# Patient Record
Sex: MALE | Race: WHITE | NOT HISPANIC OR LATINO | Employment: FULL TIME | URBAN - METROPOLITAN AREA
[De-identification: names, ages, dates, MRNs, and addresses within clinical notes are randomized per-mention and may not be internally consistent; named-entity substitution may affect disease eponyms.]

---

## 2017-02-03 ENCOUNTER — ALLSCRIPTS OFFICE VISIT (OUTPATIENT)
Dept: OTHER | Facility: OTHER | Age: 47
End: 2017-02-03

## 2017-02-03 LAB
CLARITY UR: NORMAL
COLOR UR: YELLOW
GLUCOSE (HISTORICAL): NEGATIVE
HGB UR QL STRIP.AUTO: NEGATIVE
LEUKOCYTE ESTERASE UR QL STRIP: NEGATIVE
NITRITE UR QL STRIP: NEGATIVE
PH UR STRIP.AUTO: 7 [PH]
PROT UR STRIP-MCNC: NEGATIVE MG/DL
SP GR UR STRIP.AUTO: 1.01
UROBILINOGEN UR QL STRIP.AUTO: 0.2

## 2017-10-04 ENCOUNTER — ALLSCRIPTS OFFICE VISIT (OUTPATIENT)
Dept: OTHER | Facility: OTHER | Age: 47
End: 2017-10-04

## 2017-10-05 NOTE — PROGRESS NOTES
Assessment  1  Viral syndrome (079 99) (B34 9)    Discussion/Summary    Take Tylenol as directed for pain  Take OTC DayQuil and NyQuil as discussed to treat symptoms  Call or return with problems or concerns  Possible side effects of new medications were reviewed with the patient/guardian today  The treatment plan was reviewed with the patient/guardian  The patient/guardian understands and agrees with the treatment plan      Chief Complaint  sore throat for 4-5 weeks      History of Present Illness  HPI: Has had a sore throat on and off since the beginning of September  Increased pain to throat started this afternoon  States difficult to swallow  Also c/o right earache and headache  Review of Systems    Constitutional: no fever or chills, feels well, no tiredness, no recent weight loss or weight gain  ENT: earache-and-sore throat, but-as noted in HPI  Cardiovascular: no complaints of slow or fast heart rate, no chest pain, no palpitations, no leg claudication or lower extremity edema  Respiratory: no complaints of shortness of breath, no wheezing or cough, no dyspnea on exertion, no orthopnea or PND  Gastrointestinal: no complaints of abdominal pain, no constipation, no nausea or vomiting, no diarrhea or bloody stools  Genitourinary: no complaints of dysuria or incontinence, no hesitancy, no nocturia, no genital lesion, no inadequacy of penile erection  Musculoskeletal: no complaints of arthralgia, no myalgia, no joint swelling or stiffness, no limb pain or swelling  Integumentary: no complaints of skin rash or lesion, no itching or dry skin, no skin wounds  Neurological: headache, but-as noted in HPI  ROS reviewed  Active Problems  1  Epididymitis (604 90) (N45 1)   2  Hyperlipemia, mixed (272 2) (E78 2)   3  Influenza (487 1) (J11 1)   4  Lumbago (724 2) (M54 5)   5  Need for DTaP vaccine (V06 1) (Z23)   6  Sinusitis, acute (461 9) (J01 90)   7   Urinary hesitancy (788 64) (R39 11)    Past Medical History  Active Problems And Past Medical History Reviewed: The active problems and past medical history were reviewed and updated today  Family History  Mother    1  Family history of hypertension (V17 49) (Z82 49)   2  Family history of hypothyroidism (V18 19) (Z83 49)  Father    3  Family history of Diabetes type 2, controlled   4  Family history of hypertension (V17 49) (Z82 49)  Family History Reviewed: The family history was reviewed and updated today  Social History   · Former smoker (F52 89) (W70 853)   · Smokeless tobacco use (305 1) (Z72 0)  The social history was reviewed and updated today  Surgical History  1  History of Laparosc For Obesity W/ Gastric Bypass, Small Bowel Reconstr  Surgical History Reviewed: The surgical history was reviewed and updated today  Current Meds   1  No Reported Medications Recorded    The medication list was reviewed and updated today  Allergies  1  No Known Drug Allergies    Vitals   Recorded: [id number] 76:93NE   Systolic 252   Diastolic 84   Height 6 ft 1 in   Weight 227 lb    BMI Calculated 29 95   BSA Calculated 2 27     Physical Exam    Constitutional   General appearance: No acute distress, well appearing and well nourished  Eyes   Conjunctiva and lids: No swelling, erythema, or discharge  Pupils and irises: Equal, round and reactive to light  Ears, Nose, Mouth, and Throat   External inspection of ears and nose: Normal     Otoscopic examination: Tympanic membrance translucent with normal light reflex  Canals patent without erythema  Nasal mucosa, septum, and turbinates: Normal without edema or erythema  Oropharynx: Normal with no erythema, edema, exudate or lesions  Pulmonary   Respiratory effort: No increased work of breathing or signs of respiratory distress  Auscultation of lungs: Clear to auscultation, equal breath sounds bilaterally, no wheezes, no rales, no rhonci      Cardiovascular Auscultation of heart: Normal rate and rhythm, normal S1 and S2, without murmurs  Lymphatic   Palpation of lymph nodes in neck: No lymphadenopathy  Skin   Skin and subcutaneous tissue: Normal without rashes or lesions      Psychiatric   Orientation to person, place and time: Normal     Mood and affect: Normal          Signatures   Electronically signed by : Lashaun Lopez; Oct  4 2017  3:54PM EST                       (Author)    Electronically signed by : Julisa Ortega MD; Oct  4 2017  4:17PM EST

## 2018-01-12 NOTE — RESULT NOTES
Verified Results  * XR SPINE LUMBAR MINIMUM 4 VIEWS NON INJURY [id number] 02:53PM Ramon Rivas     Test Name Result Flag Reference   XR SPINE LUMBAR MINIMUM 4 VIEWS (Report)     LUMBAR SPINE     INDICATION: Left posterior back pain for 3 weeks  COMPARISON: None     VIEWS: AP, lateral, bilateral oblique and coned down projections; 5 images     FINDINGS:     Minor lumbar dextroscoliosis  There is no radiographic evidence of acute fracture or destructive osseous lesion  Disc space narrowing L3-4 with small marginal endplate osteophytes  The pedicles appear intact  No pars defects  Visualized soft tissues appear unremarkable  Surgical clips right upper and lower quadrants  IMPRESSION:     No acute findings  Mild disc disease L3-4         Workstation performed: [id number]     Signed by:   Varinder Servin DO   9/22/16

## 2018-01-13 VITALS
WEIGHT: 227 LBS | HEIGHT: 73 IN | BODY MASS INDEX: 30.09 KG/M2 | SYSTOLIC BLOOD PRESSURE: 124 MMHG | DIASTOLIC BLOOD PRESSURE: 84 MMHG

## 2018-01-14 VITALS
SYSTOLIC BLOOD PRESSURE: 120 MMHG | HEART RATE: 96 BPM | BODY MASS INDEX: 29.16 KG/M2 | WEIGHT: 220 LBS | HEIGHT: 73 IN | DIASTOLIC BLOOD PRESSURE: 76 MMHG | OXYGEN SATURATION: 95 %

## 2018-01-15 NOTE — RESULT NOTES
Message   Recorded as Task   Date: 09/22/2016 04:22 PM, Created By: Felice Alexander   Task Name: Call Patient with results   Assigned To:  Felice Alexander   Regarding Patient: Pleasant Gravely, Status: Active   CommentKwan Mailman - 22 Sep 2016 4:22 PM     Patient Phone: [phone] disc at L3-L4   Faby Mark - 26 Sep 2016 10:20 AM     TASK EDITED                 Detailed message left C#        Signatures   Electronically signed by : Lorena Grossman, ; Sep 26 2016 10:20AM EST                       (Author)

## 2018-02-09 ENCOUNTER — OFFICE VISIT (OUTPATIENT)
Dept: FAMILY MEDICINE CLINIC | Facility: CLINIC | Age: 48
End: 2018-02-09
Payer: COMMERCIAL

## 2018-02-09 VITALS
HEIGHT: 73 IN | DIASTOLIC BLOOD PRESSURE: 60 MMHG | RESPIRATION RATE: 16 BRPM | BODY MASS INDEX: 29.55 KG/M2 | SYSTOLIC BLOOD PRESSURE: 90 MMHG | OXYGEN SATURATION: 96 % | WEIGHT: 223 LBS | HEART RATE: 78 BPM

## 2018-02-09 DIAGNOSIS — K40.90 HERNIA, INGUINAL, LEFT: Primary | ICD-10-CM

## 2018-02-09 PROCEDURE — 99213 OFFICE O/P EST LOW 20 MIN: CPT | Performed by: FAMILY MEDICINE

## 2018-02-09 NOTE — PATIENT INSTRUCTIONS
Refer to General surgery for evaluation of left inguinal hernia  In the meantime avoid heavy lifting as able

## 2018-02-09 NOTE — PROGRESS NOTES
Assessment/Plan:    No problem-specific Assessment & Plan notes found for this encounter  Diagnoses and all orders for this visit:    Hernia, inguinal, left  -     Ambulatory referral to General Surgery; Future        Patient Instructions     Refer to General surgery for evaluation of left inguinal hernia  In the meantime avoid heavy lifting as able  Subjective:      Patient ID: Tiffani Sanchez is a 52 y o  male  Patient comes in today with complaints of left side lower abdominal and or groin pain  This has been present over the last 6 months  This began while he was  Fishing  In July 2017  There is no specific injury reported  Has continued to be an issue  This tends to be an intermittent problem  He is not taking any over-the-counter medications or received any therapy for this  No urinary symptoms are reported  No testicular or genital pain is reported  Groin Pain         The following portions of the patient's history were reviewed and updated as appropriate: allergies, current medications, past family history, past medical history, past social history and problem list     Review of Systems      Objective:    Vitals:    02/09/18 1441   BP: 90/60   Pulse: 78   Resp: 16   SpO2: 96%        Physical Exam   Constitutional: He appears well-developed and well-nourished  Eyes: Conjunctivae and EOM are normal  Pupils are equal, round, and reactive to light  Neck: Normal range of motion  Neck supple  Abdominal: Soft  Bowel sounds are normal  He exhibits no mass  There is tenderness  There is no guarding  A hernia is present  Hernia confirmed positive in the left inguinal area  Hernia confirmed negative in the right inguinal area  Genitourinary: Testes normal  Right testis shows no tenderness  Left testis shows no tenderness  No penile tenderness

## 2018-02-13 ENCOUNTER — TELEPHONE (OUTPATIENT)
Dept: FAMILY MEDICINE CLINIC | Facility: CLINIC | Age: 48
End: 2018-02-13

## 2018-02-13 DIAGNOSIS — R68.89 INFLUENZA-LIKE SYMPTOMS: Primary | ICD-10-CM

## 2018-02-13 RX ORDER — OSELTAMIVIR PHOSPHATE 75 MG/1
75 CAPSULE ORAL EVERY 12 HOURS SCHEDULED
Qty: 10 CAPSULE | Refills: 0 | Status: SHIPPED | OUTPATIENT
Start: 2018-02-13 | End: 2018-02-18

## 2018-02-19 ENCOUNTER — OFFICE VISIT (OUTPATIENT)
Dept: SURGERY | Facility: HOSPITAL | Age: 48
End: 2018-02-19
Payer: COMMERCIAL

## 2018-02-19 VITALS
WEIGHT: 227.4 LBS | TEMPERATURE: 98.4 F | RESPIRATION RATE: 16 BRPM | SYSTOLIC BLOOD PRESSURE: 120 MMHG | HEART RATE: 68 BPM | DIASTOLIC BLOOD PRESSURE: 84 MMHG | BODY MASS INDEX: 30.14 KG/M2 | HEIGHT: 73 IN

## 2018-02-19 DIAGNOSIS — L91.8 SKIN TAGS, MULTIPLE ACQUIRED: ICD-10-CM

## 2018-02-19 DIAGNOSIS — L21.9 SEBORRHEA: ICD-10-CM

## 2018-02-19 DIAGNOSIS — K40.90 HERNIA, INGUINAL, LEFT: ICD-10-CM

## 2018-02-19 DIAGNOSIS — K40.90 LEFT INGUINAL HERNIA: ICD-10-CM

## 2018-02-19 DIAGNOSIS — E66.9 OBESITY (BMI 30-39.9): ICD-10-CM

## 2018-02-19 DIAGNOSIS — D22.9 MULTIPLE PIGMENTED NEVI: Primary | ICD-10-CM

## 2018-02-19 PROCEDURE — 99244 OFF/OP CNSLTJ NEW/EST MOD 40: CPT | Performed by: SURGERY

## 2018-02-19 NOTE — PROGRESS NOTES
Assessment/Plan: Tiffani Sanchez is a 52year old male who presents today, per referral by Dr Hanh Akers, for consultation regarding a possible left inguinal hernia  Physical exam revealed a left inguinal hernia  Explained etiology of hernias  Discussed risks, benefits, and alternatives to open left inguinal hernia repair  Explained the surgery, as well as pre- and post-operative protocol and restrictions  No heavy lifting greater than 15 pounds for weeks 1-2, no strenuous activity  No heavy lifting greater than 25 pounds for weeks 3-4, light cardio permissible  He understands that he may not drive until he is completely off pain medication  He will schedule surgery for his earliest convenience  He knows to call if any questions or concerns arise  Skin- Encouraged him to have his skin tags of neck and  pigmented nevi and scattered seborrhea of the back monitored by his PCP or dermatologist    Obese- Discussed diet and exercise in the context of weight loss  No problem-specific Assessment & Plan notes found for this encounter  Diagnoses and all orders for this visit:    Hernia, inguinal, left  -     Ambulatory referral to General Surgery          Subjective:      Patient ID: Tiffani Sanchez is a 52 y o  male  Tiffani Sanchez is a 52year old male who presents today, per referral by Dr Hanh Akers, for consultation regarding a possible left inguinal hernia  He felt something pull while fishing a few weeks ago  He thought it was a muscle strain but he still feels pulling pain  Past surgical history of gastric bypass surgery  He is obese with a BMI of 30 00  The following portions of the patient's history were reviewed and updated as appropriate: allergies, current medications, past family history, past medical history, past social history, past surgical history and problem list     Review of Systems   Constitutional: Negative  HENT: Negative  Eyes: Negative  Respiratory: Negative  Cardiovascular: Negative  Gastrointestinal: Positive for abdominal pain  Endocrine: Negative  Genitourinary: Negative  Musculoskeletal: Negative  Skin: Negative  Allergic/Immunologic: Negative  Neurological: Negative  Hematological: Negative  Psychiatric/Behavioral: Negative  All other systems reviewed and are negative  Objective:      /84 (BP Location: Left arm, Patient Position: Sitting, Cuff Size: Standard)   Pulse 68   Temp 98 4 °F (36 9 °C) (Tympanic)   Resp 16   Ht 6' 1" (1 854 m)   Wt 103 kg (227 lb 6 4 oz)   BMI 30 00 kg/m²          Physical Exam   Constitutional: He is oriented to person, place, and time  He appears well-developed and well-nourished  No distress  Obese  HENT:   Head: Normocephalic and atraumatic  Right Ear: External ear normal    Left Ear: External ear normal    Nose: Nose normal    Mouth/Throat: Oropharynx is clear and moist  No oropharyngeal exudate  Eyes: Conjunctivae and EOM are normal  Right eye exhibits no discharge  Left eye exhibits no discharge  No scleral icterus  Neck: Normal range of motion  Neck supple  No JVD present  No tracheal deviation present  No thyromegaly present  Cardiovascular: Normal rate, regular rhythm, normal heart sounds and intact distal pulses  Exam reveals no gallop and no friction rub  No murmur heard  Pulmonary/Chest: Effort normal and breath sounds normal  No stridor  No respiratory distress  He has no wheezes  He has no rales  He exhibits no tenderness  Abdominal: Soft  Bowel sounds are normal  He exhibits no distension and no mass  There is no tenderness  There is no rebound and no guarding  Left inguinal hernia  Musculoskeletal: Normal range of motion  He exhibits no edema, tenderness or deformity  Lymphadenopathy:     He has no cervical adenopathy  Neurological: He is alert and oriented to person, place, and time  No cranial nerve deficit   Coordination normal    Skin: Skin is dry  No rash noted  He is not diaphoretic  No erythema  No pallor  Skin tags of neck  Pigmented nevi and scattered seborrhea of the back  Psychiatric: He has a normal mood and affect  His behavior is normal  Thought content normal    Nursing note and vitals reviewed  By signing my name below, Mona Olivares, attest that this documentation has been prepared under the direction and in the presence of Dr Jeyson Li MD  Electronically signed: [facility] Scribe  2/19/18  15:35  Wally Lopez, personally performed the services described in this documentation  All medical record entries made by the scribe were at my direction and in my presence  I have reviewed the chart and agree that the record reflects my personal performance and is accurate and complete  Dr Jeyson Li MD  2/19/18  15:35

## 2018-02-20 PROBLEM — K40.90 HERNIA, INGUINAL, LEFT: Status: ACTIVE | Noted: 2018-02-20

## 2018-02-20 RX ORDER — SODIUM CHLORIDE, SODIUM LACTATE, POTASSIUM CHLORIDE, CALCIUM CHLORIDE 600; 310; 30; 20 MG/100ML; MG/100ML; MG/100ML; MG/100ML
125 INJECTION, SOLUTION INTRAVENOUS CONTINUOUS
Status: CANCELLED | OUTPATIENT
Start: 2018-02-27

## 2018-02-23 ENCOUNTER — APPOINTMENT (OUTPATIENT)
Dept: LAB | Facility: HOSPITAL | Age: 48
End: 2018-02-23
Payer: COMMERCIAL

## 2018-02-23 DIAGNOSIS — Z01.818 PRE-OP TESTING: Primary | ICD-10-CM

## 2018-02-23 LAB
BILIRUB UR QL STRIP: NEGATIVE
CLARITY UR: CLEAR
COLOR UR: YELLOW
GLUCOSE UR STRIP-MCNC: NEGATIVE MG/DL
HGB UR QL STRIP.AUTO: NEGATIVE
KETONES UR STRIP-MCNC: NEGATIVE MG/DL
LEUKOCYTE ESTERASE UR QL STRIP: NEGATIVE
NITRITE UR QL STRIP: NEGATIVE
PH UR STRIP.AUTO: 5.5 [PH] (ref 4.5–8)
PROT UR STRIP-MCNC: NEGATIVE MG/DL
SP GR UR STRIP.AUTO: >=1.03 (ref 1–1.03)
UROBILINOGEN UR QL STRIP.AUTO: 0.2 E.U./DL

## 2018-02-23 PROCEDURE — 81003 URINALYSIS AUTO W/O SCOPE: CPT | Performed by: PHYSICIAN ASSISTANT

## 2018-02-23 RX ORDER — BIOTIN 10 MG
TABLET ORAL
COMMUNITY

## 2018-02-23 RX ORDER — MULTIVITAMIN
1 CAPSULE ORAL DAILY
COMMUNITY

## 2018-02-23 NOTE — PRE-PROCEDURE INSTRUCTIONS
Pre-Surgery Instructions:   Medication Instructions    Biotin 10 MG TABS Patient was instructed by Physician and understands   calcium carbonate-vitamin D (OSCAL-D) 500 mg-200 units per tablet Patient was instructed by Physician and understands   Cyanocobalamin (VITAMIN B 12 PO) Patient was instructed by Physician and understands   Glucosamine-Chondroitin (GLUCOSAMINE CHONDR COMPLEX PO) Patient was instructed by Physician and understands   Multiple Vitamin (MULTIVITAMIN) capsule Patient was instructed by Physician and understands  Before your operation, you play an important role in decreasing your risk for infection by washing with special antiseptic soap  This is an effective way to reduce bacteria on the skin which may help to prevent infections at the surgical site  Please read the following directions in advance  1  In the week before your operation purchase a 4 ounce bottle of antiseptic soap containing chlorhexidine gluconate 4%  Some brand names include: Aplicare, Endure, and Hibiclens  The cost is usually less than $5 00  · For your convenience, the [address] [place] [address] carries the soap  · It may also be available at your doctor's office or pre-admission testing center, and at most retail pharmacies  · If you are allergic or sensitive to soaps containing chlorhexidine gluconate (CHG), please let your doctor know so another antiseptic soap can be suggested  · CHG antiseptic soap is for external use only  2      The day before your operation follow these directions carefully to get ready  · Place clean lines (sheets) on your bed; you should sleep on clean sheets after your evening shower  · Get clean towels and washcloths ready - you need enough for 2 showers  · Set aside clean underwear, pajamas, and clothing to wear after the shower  Reminders:  · DO NOT use any other soap or body rinse on your skin during or after the antiseptic showers    · DO NOT use lotion , powder, deodorant, or perfume/aftershave of any kind on your skin after your antiseptic shower  · DO NOT shave any body parts in the 24 hours/the day before your operation  · DO NOT get the antiseptic soap in your eyes, ears, nose, mouth, or vaginal area  3      You will need to shower the night before AND the morning of your Surgery  Shower 1:  · The evening before your operation, take the fist shower  · First, shampoo your hair with regular shampoo and rinse it completely before you use the anitseptic soap  After washing and rinsing your hair, rinse your body  · Next, use a clean wash cloth to apply the antiseptic soap and wash your body from the neck down to your toes using 1/2 bottle of the antiseptic soap  You will use the other 1/2 bottle for the second shower  · Clean the area where your incision will be; later this area well for about 2 minutes  · If you ar having head or neck surgery, wash areas with the antiseptic soap  · Rinse yourself completely with running water  · Use a clean towel to dry off  · Wear clean underwear and clothing/pajamas  Shower 2:  · The Morning of your operation, take the second shower following the same steps as Shower 1 using the second 1/2 of the bottle of antiseptic soap  · Use clean cloths and towels to was and dry yourself off  · Wear clean underwear and clothing

## 2018-02-27 ENCOUNTER — HOSPITAL ENCOUNTER (OUTPATIENT)
Facility: HOSPITAL | Age: 48
Setting detail: OUTPATIENT SURGERY
Discharge: HOME | End: 2018-02-27
Attending: SURGERY | Admitting: SURGERY
Payer: COMMERCIAL

## 2018-02-27 ENCOUNTER — ANESTHESIA EVENT (OUTPATIENT)
Dept: PERIOP | Facility: HOSPITAL | Age: 48
End: 2018-02-27
Payer: COMMERCIAL

## 2018-02-27 ENCOUNTER — ANESTHESIA (OUTPATIENT)
Dept: PERIOP | Facility: HOSPITAL | Age: 48
End: 2018-02-27
Payer: COMMERCIAL

## 2018-02-27 VITALS
OXYGEN SATURATION: 93 % | WEIGHT: 222 LBS | HEIGHT: 73 IN | DIASTOLIC BLOOD PRESSURE: 64 MMHG | BODY MASS INDEX: 29.42 KG/M2 | HEART RATE: 62 BPM | RESPIRATION RATE: 16 BRPM | SYSTOLIC BLOOD PRESSURE: 109 MMHG | TEMPERATURE: 99.1 F

## 2018-02-27 DIAGNOSIS — K40.90 HERNIA, INGUINAL, LEFT: Primary | ICD-10-CM

## 2018-02-27 PROCEDURE — C1781 MESH (IMPLANTABLE): HCPCS | Performed by: SURGERY

## 2018-02-27 PROCEDURE — PBAPASSIST PB AP ASSIST PLACEHOLDER: Performed by: PHYSICIAN ASSISTANT

## 2018-02-27 PROCEDURE — 49505 PRP I/HERN INIT REDUC >5 YR: CPT | Performed by: SURGERY

## 2018-02-27 PROCEDURE — 49505 PRP I/HERN INIT REDUC >5 YR: CPT | Performed by: PHYSICIAN ASSISTANT

## 2018-02-27 DEVICE — BARD MESH PERFIX PLUG, LARGE
Type: IMPLANTABLE DEVICE | Site: INGUINAL | Status: FUNCTIONAL
Brand: BARD MESH PERFIX PLUG

## 2018-02-27 DEVICE — BARD MESH PERFIX PLUG, EXTRA LARGE
Type: IMPLANTABLE DEVICE | Site: INGUINAL | Status: FUNCTIONAL
Brand: BARD MESH PERFIX PLUG

## 2018-02-27 RX ORDER — PROPOFOL 10 MG/ML
INJECTION, EMULSION INTRAVENOUS AS NEEDED
Status: DISCONTINUED | OUTPATIENT
Start: 2018-02-27 | End: 2018-02-27 | Stop reason: SURG

## 2018-02-27 RX ORDER — KETOROLAC TROMETHAMINE 30 MG/ML
INJECTION, SOLUTION INTRAMUSCULAR; INTRAVENOUS AS NEEDED
Status: DISCONTINUED | OUTPATIENT
Start: 2018-02-27 | End: 2018-02-27 | Stop reason: SURG

## 2018-02-27 RX ORDER — FENTANYL CITRATE 50 UG/ML
INJECTION, SOLUTION INTRAMUSCULAR; INTRAVENOUS AS NEEDED
Status: DISCONTINUED | OUTPATIENT
Start: 2018-02-27 | End: 2018-02-27 | Stop reason: SURG

## 2018-02-27 RX ORDER — FENTANYL CITRATE/PF 50 MCG/ML
25 SYRINGE (ML) INJECTION
Status: DISCONTINUED | OUTPATIENT
Start: 2018-02-27 | End: 2018-03-02 | Stop reason: HOSPADM

## 2018-02-27 RX ORDER — ACETAMINOPHEN 325 MG/1
650 TABLET ORAL EVERY 6 HOURS PRN
Status: DISCONTINUED | OUTPATIENT
Start: 2018-02-27 | End: 2018-03-02 | Stop reason: HOSPADM

## 2018-02-27 RX ORDER — ONDANSETRON 2 MG/ML
INJECTION INTRAMUSCULAR; INTRAVENOUS AS NEEDED
Status: DISCONTINUED | OUTPATIENT
Start: 2018-02-27 | End: 2018-02-27 | Stop reason: SURG

## 2018-02-27 RX ORDER — GLYCOPYRROLATE 0.2 MG/ML
INJECTION INTRAMUSCULAR; INTRAVENOUS AS NEEDED
Status: DISCONTINUED | OUTPATIENT
Start: 2018-02-27 | End: 2018-02-27 | Stop reason: SURG

## 2018-02-27 RX ORDER — MIDAZOLAM HYDROCHLORIDE 1 MG/ML
INJECTION INTRAMUSCULAR; INTRAVENOUS AS NEEDED
Status: DISCONTINUED | OUTPATIENT
Start: 2018-02-27 | End: 2018-02-27 | Stop reason: SURG

## 2018-02-27 RX ORDER — ONDANSETRON 2 MG/ML
4 INJECTION INTRAMUSCULAR; INTRAVENOUS EVERY 6 HOURS PRN
Status: DISCONTINUED | OUTPATIENT
Start: 2018-02-27 | End: 2018-03-02 | Stop reason: HOSPADM

## 2018-02-27 RX ORDER — HYDROCODONE BITARTRATE AND ACETAMINOPHEN 5; 325 MG/1; MG/1
2 TABLET ORAL EVERY 4 HOURS PRN
Status: DISCONTINUED | OUTPATIENT
Start: 2018-02-27 | End: 2018-03-02 | Stop reason: HOSPADM

## 2018-02-27 RX ORDER — HYDROCODONE BITARTRATE AND ACETAMINOPHEN 5; 325 MG/1; MG/1
1-2 TABLET ORAL EVERY 4 HOURS PRN
Qty: 30 TABLET | Refills: 0 | Status: SHIPPED | OUTPATIENT
Start: 2018-02-27 | End: 2018-03-09

## 2018-02-27 RX ORDER — SODIUM CHLORIDE, SODIUM LACTATE, POTASSIUM CHLORIDE, CALCIUM CHLORIDE 600; 310; 30; 20 MG/100ML; MG/100ML; MG/100ML; MG/100ML
125 INJECTION, SOLUTION INTRAVENOUS CONTINUOUS
Status: DISCONTINUED | OUTPATIENT
Start: 2018-02-27 | End: 2018-03-02 | Stop reason: HOSPADM

## 2018-02-27 RX ORDER — DEXAMETHASONE SODIUM PHOSPHATE 4 MG/ML
4 INJECTION, SOLUTION INTRA-ARTICULAR; INTRALESIONAL; INTRAMUSCULAR; INTRAVENOUS; SOFT TISSUE ONCE AS NEEDED
Status: DISCONTINUED | OUTPATIENT
Start: 2018-02-27 | End: 2018-03-02 | Stop reason: HOSPADM

## 2018-02-27 RX ADMIN — FENTANYL CITRATE 25 MCG: 50 INJECTION, SOLUTION INTRAMUSCULAR; INTRAVENOUS at 08:35

## 2018-02-27 RX ADMIN — ONDANSETRON 4 MG: 2 INJECTION INTRAMUSCULAR; INTRAVENOUS at 08:40

## 2018-02-27 RX ADMIN — HYDROCODONE BITARTRATE AND ACETAMINOPHEN 1 TABLET: 5; 325 TABLET ORAL at 09:58

## 2018-02-27 RX ADMIN — FENTANYL CITRATE 25 MCG: 50 INJECTION INTRAMUSCULAR; INTRAVENOUS at 09:24

## 2018-02-27 RX ADMIN — KETOROLAC TROMETHAMINE 30 MG: 30 INJECTION, SOLUTION INTRAMUSCULAR at 08:40

## 2018-02-27 RX ADMIN — GLYCOPYRROLATE 0.2 MG: 0.2 INJECTION, SOLUTION INTRAMUSCULAR; INTRAVENOUS at 07:55

## 2018-02-27 RX ADMIN — CEFAZOLIN SODIUM 2000 MG: 2 SOLUTION INTRAVENOUS at 08:00

## 2018-02-27 RX ADMIN — SODIUM CHLORIDE, SODIUM LACTATE, POTASSIUM CHLORIDE, AND CALCIUM CHLORIDE: .6; .31; .03; .02 INJECTION, SOLUTION INTRAVENOUS at 08:50

## 2018-02-27 RX ADMIN — PROPOFOL 200 MG: 10 INJECTION, EMULSION INTRAVENOUS at 08:00

## 2018-02-27 RX ADMIN — FENTANYL CITRATE 25 MCG: 50 INJECTION INTRAMUSCULAR; INTRAVENOUS at 09:31

## 2018-02-27 RX ADMIN — MIDAZOLAM HYDROCHLORIDE 2 MG: 1 INJECTION, SOLUTION INTRAMUSCULAR; INTRAVENOUS at 07:55

## 2018-02-27 RX ADMIN — FENTANYL CITRATE 50 MCG: 50 INJECTION, SOLUTION INTRAMUSCULAR; INTRAVENOUS at 07:55

## 2018-02-27 RX ADMIN — FENTANYL CITRATE 25 MCG: 50 INJECTION, SOLUTION INTRAMUSCULAR; INTRAVENOUS at 08:15

## 2018-02-27 RX ADMIN — SODIUM CHLORIDE, SODIUM LACTATE, POTASSIUM CHLORIDE, AND CALCIUM CHLORIDE 125 ML/HR: .6; .31; .03; .02 INJECTION, SOLUTION INTRAVENOUS at 07:16

## 2018-02-27 RX ADMIN — SODIUM CHLORIDE, SODIUM LACTATE, POTASSIUM CHLORIDE, AND CALCIUM CHLORIDE: .6; .31; .03; .02 INJECTION, SOLUTION INTRAVENOUS at 07:58

## 2018-02-27 NOTE — ANESTHESIA POSTPROCEDURE EVALUATION
Post-Op Assessment Note      CV Status:  Stable    Mental Status:  Alert    Hydration Status:  Stable    PONV Controlled:  None    Airway Patency:  Patent    Post Op Vitals Reviewed: Yes          Staff: CRNA           BP      Temp (P) 99 1 °F (37 3 °C) (02/27/18 0913)    Pulse     Resp      SpO2

## 2018-02-27 NOTE — DISCHARGE SUMMARY
Discharge Summary - Colletta Bacca 52 y o  male MRN: [id number]    Unit/Bed#: OR Universal Encounter: [id number]      Pre-Op Diagnosis Codes:     * Hernia, inguinal, left [K40 90]  Post-Op Diagnosis Codes:     * Hernia, inguinal, left [K40 90]      Procedures Performed:  Open repair of left inguinal hernia with mesh        See H and P for full details of admission and op note  Patient recovered well and was discharged home    Patient was seen and examined prior to discharge  Provisions for Follow-Up Care:  See after visit summary for information related to follow-up care and any pertinent home health orders  Disposition: Home, in stable condition  Planned Readmission: No    Discharge Medications:  See after visit summary for reconciled discharge medications provided to patient and family  Post op instructions reviewed with patient and/or family  Rx given for discharge  Pt  discharge in improved and good condition      Signature:   Gurmeet Reddy PA-C  Date: 2/27/2018 Time: 9:20 AM

## 2018-02-27 NOTE — INTERIM OP NOTE
REPAIR HERNIA INGUINAL WITH MESH  Postoperative Note  PATIENT NAME: Ashish Wang  : 1970  MRN: [id number]  [facility] OR ROOM 02    Surgery Date: 2018    Preop Diagnosis:  Hernia, inguinal, left [K40 90]    Post-Op Diagnosis Codes:     * Hernia, inguinal, left [K40 90]    Procedure(s) (LRB):  REPAIR HERNIA INGUINAL WITH MESH (Left) open    Surgeon(s) and Role:     * Rachel Santana MD - Primary     * Sylvester Low PA-C - Assisting    Specimens:  * No specimens in log *    Estimated Blood Loss:   Minimal    Anesthesia Type:   General LMA    Findings:    as above  Complications:   None    Hernia Surgery Operative Note    Name: Ashish Wang    Gender: male    Age: 52 y o  Race:     BMI: Body mass index is 29 29 kg/m²      DIAGNOSIS: left inguinal hernia    Diabetes Mellitis: No    Coronary Heart Disease: No    Cancer: No    Steroid Use: No    Tobacco use: No   Last used: never smoked     Alcohol use: Yes Minimal     Location of Hernia: left indirect inguinal hernia  Length:2 0 cm  Width:2 0 cm  Primary: Yes  Recurrent: No   Number of recurrences:    Access: Open    Component Separation: No    Mesh:   Yes -  Type: Synthetic     BARD Mesh Perfix Plug large and x-large    Operative Time: 46 min               SIGNATURE: Sylvester Low PA-C   DATE: 2018   TIME: 9:13 AM

## 2018-02-27 NOTE — H&P (VIEW-ONLY)
Assessment/Plan: Chanda Liang is a 52year old male who presents today, per referral by Dr Martha Causey, for consultation regarding a possible left inguinal hernia  Physical exam revealed a left inguinal hernia  Explained etiology of hernias  Discussed risks, benefits, and alternatives to open left inguinal hernia repair  Explained the surgery, as well as pre- and post-operative protocol and restrictions  No heavy lifting greater than 15 pounds for weeks 1-2, no strenuous activity  No heavy lifting greater than 25 pounds for weeks 3-4, light cardio permissible  He understands that he may not drive until he is completely off pain medication  He will schedule surgery for his earliest convenience  He knows to call if any questions or concerns arise  Skin- Encouraged him to have his skin tags of neck and  pigmented nevi and scattered seborrhea of the back monitored by his PCP or dermatologist    Obese- Discussed diet and exercise in the context of weight loss  No problem-specific Assessment & Plan notes found for this encounter  Diagnoses and all orders for this visit:    Hernia, inguinal, left  -     Ambulatory referral to General Surgery          Subjective:      Patient ID: Chanda Liang is a 52 y o  male  Chanda Liang is a 52year old male who presents today, per referral by Dr Martha Causey, for consultation regarding a possible left inguinal hernia  He felt something pull while fishing a few weeks ago  He thought it was a muscle strain but he still feels pulling pain  Past surgical history of gastric bypass surgery  He is obese with a BMI of 30 00  The following portions of the patient's history were reviewed and updated as appropriate: allergies, current medications, past family history, past medical history, past social history, past surgical history and problem list     Review of Systems   Constitutional: Negative  HENT: Negative  Eyes: Negative  Respiratory: Negative  Cardiovascular: Negative  Gastrointestinal: Positive for abdominal pain  Endocrine: Negative  Genitourinary: Negative  Musculoskeletal: Negative  Skin: Negative  Allergic/Immunologic: Negative  Neurological: Negative  Hematological: Negative  Psychiatric/Behavioral: Negative  All other systems reviewed and are negative  Objective:      /84 (BP Location: Left arm, Patient Position: Sitting, Cuff Size: Standard)   Pulse 68   Temp 98 4 °F (36 9 °C) (Tympanic)   Resp 16   Ht 6' 1" (1 854 m)   Wt 103 kg (227 lb 6 4 oz)   BMI 30 00 kg/m²          Physical Exam   Constitutional: He is oriented to person, place, and time  He appears well-developed and well-nourished  No distress  Obese  HENT:   Head: Normocephalic and atraumatic  Right Ear: External ear normal    Left Ear: External ear normal    Nose: Nose normal    Mouth/Throat: Oropharynx is clear and moist  No oropharyngeal exudate  Eyes: Conjunctivae and EOM are normal  Right eye exhibits no discharge  Left eye exhibits no discharge  No scleral icterus  Neck: Normal range of motion  Neck supple  No JVD present  No tracheal deviation present  No thyromegaly present  Cardiovascular: Normal rate, regular rhythm, normal heart sounds and intact distal pulses  Exam reveals no gallop and no friction rub  No murmur heard  Pulmonary/Chest: Effort normal and breath sounds normal  No stridor  No respiratory distress  He has no wheezes  He has no rales  He exhibits no tenderness  Abdominal: Soft  Bowel sounds are normal  He exhibits no distension and no mass  There is no tenderness  There is no rebound and no guarding  Left inguinal hernia  Musculoskeletal: Normal range of motion  He exhibits no edema, tenderness or deformity  Lymphadenopathy:     He has no cervical adenopathy  Neurological: He is alert and oriented to person, place, and time  No cranial nerve deficit   Coordination normal    Skin: Skin is dry  No rash noted  He is not diaphoretic  No erythema  No pallor  Skin tags of neck  Pigmented nevi and scattered seborrhea of the back  Psychiatric: He has a normal mood and affect  His behavior is normal  Thought content normal    Nursing note and vitals reviewed  By signing my name below, Dorina Flores, attest that this documentation has been prepared under the direction and in the presence of Dr Gardiner Sicard, MD  Electronically signed: Angela Engel, Medical Scribe  2/19/18  15:35  [facility], personally performed the services described in this documentation  All medical record entries made by the scribe were at my direction and in my presence  I have reviewed the chart and agree that the record reflects my personal performance and is accurate and complete  Dr Gardiner Sicard, MD  2/19/18  15:35

## 2018-02-27 NOTE — DISCHARGE INSTRUCTIONS
Ashlyn BURGOS     1  General: Kristyn Howard will feel pulling sensations around the wound or funny aches and pains around the incisions  This is normal  Even minor surgery is a change in your body and this is your bodys way of reaction to it  If you have had abdominal surgery, it may help to support the incision with a small pillow or blanket for comfort when moving or coughing  2  Wound care:       Glue - Leave glue alone, it will fall off on its own, no need for an additional dressings    3  Water: You may shower over the wound, unless there are drain tubes left in place  Do not bathe or use a pool or hot tub until cleared by the physician  You may shower right over the staples, glue or Steri-Strips and rinse wound with soapy water but do not scrub incision pat dry when you are done  4  Activity: You may go up and down stairs, walk as much as you are comfortable, but walk at least 3 times each day  If you have had abdominal surgery, do not lift anything heavier than 15 pounds for at least 2 weeks, unless cleared by the doctor  5  Diet: You may resume a regular diet  If you had a same-day surgery or overnight stay surgery, you may wish to eat lightly for a few days: soups, crackers, and sandwiches  You may resume a regular diet when ready  6  Medications: Resume all of your previous medications, unless told otherwise by the doctor  Avoid aspirin or ibuprofen (Advil, Motrin, etc ) products for 2-3 days after the date of surgery  You may, at that time, began to take them again  Tylenol is always fine, unless you are taking any narcotic pain medication containing Tylenol (such as Percocet, Darvocet, Vicodin, or anything containing acetaminophen)  Do not take Tylenol if you're taking these medications  You do not need to take the narcotic pain medications unless you are having significant pain and discomfort      7  Driving: He will need someone to drive you home on the day of surgery  Do not drive or make any important decisions while on narcotic pain medication or 24 hours and after anesthesia or sedation for surgery  Generally, you may drive when your off all narcotic pain medications  8  Upset Stomach: You may take Maalox, Tums, or similar items for an upset stomach  If your narcotic pain medication causes an upset stomach, do not take it on an empty stomach  Try taking it with at least some crackers or toast      9  Constipation: Patients often experienced constipation after surgery  You may take over-the-counter medication for this, such as Metamucil, Senokot, Dulcolax, milk of magnesia, etc  You may take a suppository unless you have had anorectal surgery such as a procedure on your hemorrhoids  If you experience significant nausea or vomiting after abdominal surgery, call the office before trying any of these medications  10  Call the office: If you are experiencing any of the following, fevers above 101 5°, significant nausea or vomiting, if the wound develops drainage and/or is excessive redness around the wound, or if you have significant diarrhea or other worsening symptoms  11  Pain: You may be given a prescription for pain  This will be given to the hospital, the day of surgery  12  Sexual Activity: You may resume sexual activity when you feel ready and comfortable and your incision is sealed and healed without apparent infection risk  13   Follow up with Dr Domenico Felix in 2 weeks  [facility]  Phone: [phone]      Return to Work Instructions, Ambulatory Care   GENERAL INFORMATION:   Date of illness, injury, or procedure: 02/27/18  Do not return to work until your healthcare provider says it is okay  · May return to work with the following work restrictions: TBD  · Patient generally he may not return to work for 4 weeks after this procedure without restriction      ¨ Lifting and carrying:  15-20 lbs x 4 weeks    ¨ Workday limits:      ¨ Activity:  As tolerated, no strenuous activity    ¨ Driving and machinery operation:  Not while using narcotic pain medication    ¨ Avoid dirt and moisture:  Keep wounds clean    ¨ Restrictions due to medications: as above    · May return to regular work activity with NO restrictions on: TBD    · Follow-up visit information:  Follow up with Dr Donta Bautista as scheduled in 2 weeks  If your condition is (or may be) work-related:  Tell your employer about your work-related illness or injury as soon as possible  Those that you should talk to may include your supervisor, human resources, and employee health  Ask your employer about any paperwork or follow-up visits that may be required after a work-related illness or injury  Healthcare provider's signature:  Date signed:  02/27/18     Date of last medical evaluation (if different than above): 02/27/18      Medical healthcare provider's name and information (please print):  Dr Audra Merchant  [facility] [address]  [phone]      For more information:   · [place] [address] , [place]   Phone: [phone]  Web Address: [employer]  · Occupational Safety and Health Administration (OSHA),  Dept of Labor  [address] [place] [address] [address] , [place]   Phone: [phone]  Web Address: [employer]  CARE AGREEMENT:   You have the right to help plan your care  Learn about your health condition and how it may be treated  Discuss treatment options with your caregivers to decide what care you want to receive  You always have the right to refuse treatment  The above information is an  only  It is not intended as medical advice for individual conditions or treatments  Talk to your doctor, nurse or pharmacist before following any medical regimen to see if it is safe and effective for you    © 2014 [facility]  Information is for End User's use only and may not be sold, redistributed or otherwise used for commercial purposes  All illustrations and images included in CareNotes® are the copyrighted property of A D A M , Inc  or Sam Galindo

## 2018-02-27 NOTE — OP NOTE
Inguinal Hernia, Open, Procedure Note    Name: Magdaleno Lebron   : 1970  MRN: [id number]  Date: 2018    Indications: The patient presented with a history of a left, reducible hernia  Pre-operative Diagnosis: left reducible inguinal hernia    Post-operative Diagnosis: left reducible indirectinguinal hernia    Procedure: Open repair of left inguinal hernia with mesh    Surgeon: Audra Merchant MD    Assistants: Pati Reddy PA-C, no qualified resident available  PA was medically necessary for the surgical safety of the case, including suturing, retraction, hemostasis  Anesthesia: Monitored Local Anesthesia with Sedation    Procedure Details   The patient was seen again in the Holding Room  The risks, benefits, complications, treatment options, and expected outcomes were discussed with the patient  The possibilities of reaction to medication, pulmonary aspiration, perforation of viscus, bleeding, postoperative short or long term nerve entrapment causing pain,recurrent infection, the need for additional procedures, and development of a complication requiring transfusion or further operation were discussed with the patient and/or family  There was concurrence with the proposed plan, and informed consent was obtained  The site of surgery was properly noted/marked  The patient was taken to the Operating Room, identified as Magdalneo Lebron and the procedure verified as hernia repair  A Time Out was held and the above information confirmed  The patient was prepped and draped in a sterile fashion  A timeout was again performed  Local anesthesia was used in the incision as well as performing an ilioinguinal nerve block  An inguinal incision was made  Dissection carried out to Keith's fascia  Dissection carried out to the external oblique  The external oblique fascia was opened sharply  Medial and lateral flaps were created and retracted  Care was taken to preserve the sensory nerves    The cord structures were brought out of the wound and secured using a Penrose drain  The cord was carefully inspected for the evidence of a hernia sac  If there was a hernia sac, this was dissected off the cord structures, opened, its contents reduced, and suture ligated at its base using a 0 Vicryl pursestring suture under direct vision  The lipoma was teased off the cord structures and also suture ligated at its base using a 0 Vicryl suture, this was excised  The floor of the canal was examined for any defect  If there was a defect in the floor of the canal this was scored and reduced  An appropriate size plug was placed into the defect of the floor and/or ring  This was secured using interrupted 2-0 Prolene sutures  An additional large plug was used and secured with prolene as well  An onlay patch was trimmed to fit the size of the canal   This was secured to the pubic tubercle, shelving edge and conjoint tendon using interrupted 2-0 Prolene sutures  The edges of the mesh were tucked around the cord and tacked down slightly  The cord structures were returned to their anatomic location  The wound was irrigated  The external oblique was closed using a running 2-0 PDS suture, taking care to preserve the sensory nerves if possible  The wound was closed in multiple layers using 3-0 Vicryl sutures and the skin closed using a 4-0 Monocryl subcuticular stitch  The wound was dressed  The patient was anatomically correct at the end of the procedure  The patient tolerated the procedure in good condition  Instrument, sponge, and needle counts were correct prior to closure and at the conclusion of the case  This text is generated with voice recognition software  There may be translation, syntax,  or grammatical errors  If you have any questions, please contact the dictating provider         Findings: left reducible indirectinguinal hernia    Estimated Blood Loss: Minimal           Specimens: All specimens were sent for pathology  * No orders in the log *         Complications: None; patient tolerated the procedure well             Disposition: PACU            Condition: stable    Signature:   Katia Wood MD  Date: 2/27/2018 Time: 9:01 AM

## 2018-02-27 NOTE — ANESTHESIA PREPROCEDURE EVALUATION
Review of Systems/Medical History  Patient summary reviewed  Chart reviewed      Cardiovascular   Pulmonary       GI/Hepatic            Endo/Other     GYN       Hematology   Musculoskeletal       Neurology   Psychology           Physical Exam    Airway    Mallampati score: II         Dental   No notable dental hx     Cardiovascular  Rhythm: regular, Rate: normal, Cardiovascular exam normal    Pulmonary  Pulmonary exam normal     Other Findings        Anesthesia Plan  ASA Score- 1     Anesthesia Type- general with ASA Monitors  Additional Monitors:   Airway Plan: LMA  Plan Factors-    Induction- intravenous  Postoperative Plan- Plan for postoperative opioid use  Informed Consent- Anesthetic plan and risks discussed with patient

## 2018-03-16 ENCOUNTER — OFFICE VISIT (OUTPATIENT)
Dept: SURGERY | Facility: HOSPITAL | Age: 48
End: 2018-03-16

## 2018-03-16 VITALS — WEIGHT: 229.2 LBS | TEMPERATURE: 98.1 F | HEIGHT: 73 IN | BODY MASS INDEX: 30.38 KG/M2

## 2018-03-16 DIAGNOSIS — Z98.890 POST-OPERATIVE STATE: Primary | ICD-10-CM

## 2018-03-16 PROBLEM — Z87.19 S/P INGUINAL HERNIA REPAIR: Status: ACTIVE | Noted: 2018-03-16

## 2018-03-16 PROCEDURE — 99024 POSTOP FOLLOW-UP VISIT: CPT | Performed by: PHYSICIAN ASSISTANT

## 2018-03-16 NOTE — LETTER
March 16, 2018     Patient: Colletta Bacca   YOB: 1970   Date of Visit: 3/16/2018       To Whom it May Concern:    Colletta Bacca is under my professional care  He was seen in my office on 3/16/2018  He may return to work on 3/28/2018 with no restrictions  If you have any questions or concerns, please don't hesitate to call           Sincerely,          Gurmeet Reddy PA-C        CC: No Recipients

## 2018-03-16 NOTE — PATIENT INSTRUCTIONS
Continue activity and lifting restrictions as provided in postoperative instructions  Continue other activities as tolerated  May return to work in 2 weeks without restrictions  No further follow-up required  Please call with any questions or concerns

## 2018-03-16 NOTE — PROGRESS NOTES
Assessment/Plan:  Kirsten Conroy is a 52 y o male who comes in today for postoperative check after open left inguinal hernia repair on 2/27/2018 by Dr Trevin Little at [facility]  Patient is doing well postoperatively and recovering as expected  Postoperative restrictions reviewed, including specific lifting and exercise restrictions  All questions answered  He can return to normal activities with lifting restriction of 25 lb for the next 2 weeks  He will return to work without restrictions in 2 weeks  Work note provided  No further follow-up is required unless patient develops new symptoms  He was instructed to call with any questions or concerns  HPI:  Kirsten Conroy is a 52 y o male who comes in today for postoperative check after recent open left inguinal hernia repair with mesh on 02/27/2018 by Dr Trevin Little at [facility]  Currently doing well without problems  He complains of an occasional pulling sensation at left inguinal area  He has not used any pain medication since postoperative day 4  He is tolerating a full diet and moving his bowels  His appetite is good  He is tolerating activities with a lifting restriction of 15 lbs  His incision site has remained intact without signs of infection  He has not returned to work at this time as he is unable to work with restrictions  ROS:  General ROS: negative for - chills, fatigue, fever or night sweats, weight loss  Respiratory ROS: no cough, shortness of breath, or wheezing  Cardiovascular ROS: no chest pain or dyspnea on exertion  Genito-Urinary ROS: no dysuria, trouble voiding, or hematuria  Musculoskeletal ROS: negative for - gait disturbance, joint pain or muscle pain  Neurological ROS: no TIA or stroke symptoms    ALLERGIES:  Patient has no known allergies      Current Outpatient Prescriptions:     Biotin 10 MG TABS, Take by mouth, Disp: , Rfl:     calcium carbonate-vitamin D (OSCAL-D) 500 mg-200 units per tablet, Take 1 tablet by mouth 2 (two) times a day with meals, Disp: , Rfl:     Cyanocobalamin (VITAMIN B 12 PO), Take by mouth, Disp: , Rfl:     Glucosamine-Chondroitin (GLUCOSAMINE CHONDR COMPLEX PO), Take by mouth, Disp: , Rfl:     Multiple Vitamin (MULTIVITAMIN) capsule, Take 1 capsule by mouth daily, Disp: , Rfl:   Past Medical History:   Diagnosis Date   Zaldivar H/O vasectomy     Inguinal hernia, left 02/19/2018    CLEMENTINE AIKEN MD     Past Surgical History:   Procedure Laterality Date    APPENDECTOMY      CHOLECYSTECTOMY      GASTRECTOMY      gastric bypass    GA[facility]PASS  2012    [facility]   HAND SURGERY      KNEE ARTHROSCOPY      MD REPAIR ING HERNIA,5+Y/O,REDUCIBL Left 2/27/2018    Procedure: REPAIR HERNIA INGUINAL WITH MESH;  Surgeon: Dimas Humphries MD;  Location: [facility] OR;  Service: General     Family History   Problem Relation Age of Onset    Cancer Mother     Hypertension Mother     Thyroid disease Mother     Diabetes Father     COPD Sister     Mental illness Neg Hx       reports that he has never smoked  His smokeless tobacco use includes Snuff  He reports that he drinks alcohol  He reports that he does not use drugs  Physical Exam   PHYSICAL EXAM  General: normal, cooperative, no distress  Neck: supple, FROM  Lungs: CTA B/L, no wheezes or respiratory distress  Heart: RRR, Normal S1-S2  Abdominal: soft, nondistended, nontender or normal bowel sounds  Incision: clean, dry, and intact and healing well  Ext: No cyanosis, no edema  No tenderness or swelling in thighs or calves

## 2018-10-15 DIAGNOSIS — Z13.6 SCREENING FOR CARDIOVASCULAR CONDITION: Primary | ICD-10-CM

## 2018-10-28 LAB
ALBUMIN SERPL-MCNC: 4.4 G/DL (ref 3.5–5.5)
ALBUMIN/GLOB SERPL: 2 {RATIO} (ref 1.2–2.2)
ALP SERPL-CCNC: 56 IU/L (ref 39–117)
ALT SERPL-CCNC: 19 IU/L (ref 0–44)
AST SERPL-CCNC: 18 IU/L (ref 0–40)
BILIRUB SERPL-MCNC: 0.5 MG/DL (ref 0–1.2)
BUN SERPL-MCNC: 17 MG/DL (ref 6–24)
BUN/CREAT SERPL: 20 (ref 9–20)
CALCIUM SERPL-MCNC: 9.4 MG/DL (ref 8.7–10.2)
CHLORIDE SERPL-SCNC: 103 MMOL/L (ref 96–106)
CHOLEST SERPL-MCNC: 175 MG/DL (ref 100–199)
CHOLEST/HDLC SERPL: 2.9 RATIO (ref 0–5)
CO2 SERPL-SCNC: 22 MMOL/L (ref 20–29)
CREAT SERPL-MCNC: 0.85 MG/DL (ref 0.76–1.27)
GLOBULIN SER-MCNC: 2.2 G/DL (ref 1.5–4.5)
GLUCOSE SERPL-MCNC: 97 MG/DL (ref 65–99)
HDLC SERPL-MCNC: 60 MG/DL
LDLC SERPL CALC-MCNC: 97 MG/DL (ref 0–99)
POTASSIUM SERPL-SCNC: 4.4 MMOL/L (ref 3.5–5.2)
PROT SERPL-MCNC: 6.6 G/DL (ref 6–8.5)
SL AMB EGFR AFRICAN AMERICAN: 119 ML/MIN/1.73
SL AMB EGFR NON AFRICAN AMERICAN: 103 ML/MIN/1.73
SL AMB VLDL CHOLESTEROL CALC: 18 MG/DL (ref 5–40)
SODIUM SERPL-SCNC: 139 MMOL/L (ref 134–144)
TRIGL SERPL-MCNC: 90 MG/DL (ref 0–149)

## 2018-10-30 ENCOUNTER — OFFICE VISIT (OUTPATIENT)
Dept: FAMILY MEDICINE CLINIC | Facility: CLINIC | Age: 48
End: 2018-10-30
Payer: COMMERCIAL

## 2018-10-30 VITALS
RESPIRATION RATE: 16 BRPM | SYSTOLIC BLOOD PRESSURE: 120 MMHG | OXYGEN SATURATION: 99 % | WEIGHT: 227 LBS | HEART RATE: 78 BPM | DIASTOLIC BLOOD PRESSURE: 70 MMHG | HEIGHT: 73 IN | BODY MASS INDEX: 30.09 KG/M2

## 2018-10-30 DIAGNOSIS — Z79.899 ENCOUNTER FOR LONG-TERM (CURRENT) USE OF MEDICATIONS: ICD-10-CM

## 2018-10-30 DIAGNOSIS — N52.1 ERECTILE DISORDER DUE TO MEDICAL CONDITION IN MALE: ICD-10-CM

## 2018-10-30 DIAGNOSIS — R53.83 FATIGUE, UNSPECIFIED TYPE: ICD-10-CM

## 2018-10-30 DIAGNOSIS — E53.8 VITAMIN B12 DEFICIENCY: ICD-10-CM

## 2018-10-30 DIAGNOSIS — N40.1 BENIGN PROSTATIC HYPERPLASIA WITH LOWER URINARY TRACT SYMPTOMS, SYMPTOM DETAILS UNSPECIFIED: ICD-10-CM

## 2018-10-30 DIAGNOSIS — Z98.84 BARIATRIC SURGERY STATUS: ICD-10-CM

## 2018-10-30 DIAGNOSIS — Z00.00 WELLNESS EXAMINATION: ICD-10-CM

## 2018-10-30 DIAGNOSIS — E55.9 VITAMIN D DEFICIENCY: Primary | ICD-10-CM

## 2018-10-30 DIAGNOSIS — E03.9 HYPOTHYROIDISM, UNSPECIFIED TYPE: ICD-10-CM

## 2018-10-30 PROCEDURE — 99396 PREV VISIT EST AGE 40-64: CPT | Performed by: FAMILY MEDICINE

## 2018-10-30 PROCEDURE — 99215 OFFICE O/P EST HI 40 MIN: CPT | Performed by: FAMILY MEDICINE

## 2018-10-30 RX ORDER — FINASTERIDE 5 MG/1
5 TABLET, FILM COATED ORAL DAILY
Qty: 90 TABLET | Refills: 3 | Status: SHIPPED | OUTPATIENT
Start: 2018-10-30 | End: 2018-11-27

## 2018-10-30 RX ORDER — DOXAZOSIN 2 MG/1
2 TABLET ORAL
Qty: 90 TABLET | Refills: 3 | Status: SHIPPED | OUTPATIENT
Start: 2018-10-30 | End: 2018-11-27

## 2018-10-31 PROBLEM — E55.9 VITAMIN D DEFICIENCY: Status: ACTIVE | Noted: 2018-10-31

## 2018-10-31 PROBLEM — N40.1 BENIGN PROSTATIC HYPERPLASIA WITH LOWER URINARY TRACT SYMPTOMS: Status: ACTIVE | Noted: 2018-10-31

## 2018-10-31 PROBLEM — E53.8 VITAMIN B12 DEFICIENCY: Status: ACTIVE | Noted: 2018-10-31

## 2018-10-31 PROBLEM — N52.1 ERECTILE DISORDER DUE TO MEDICAL CONDITION IN MALE: Status: ACTIVE | Noted: 2018-10-31

## 2018-10-31 NOTE — PROGRESS NOTES
[address]        NAME: Jennifer Miles is a 50 y o  male  : 1970    MRN: [id number]  DATE: 2018  TIME: 6:20 AM    Assessment and Plan   Vitamin D deficiency [E55 9]  1  Vitamin D deficiency  Vitamin D 25 hydroxy    Vitamin D 25 hydroxy   2  Vitamin B12 deficiency  Vitamin B12    Vitamin B12   3  Fatigue, unspecified type  CBC and differential    Magnesium    Sedimentation rate, automated    Ferritin    Iron    CBC and differential    Magnesium    Sedimentation rate, automated    Ferritin    Iron   4  Hypothyroidism, unspecified type  TSH, 3rd generation    TSH, 3rd generation   5  Encounter for long-term (current) use of medications     6  Benign prostatic hyperplasia with lower urinary tract symptoms, symptom details unspecified  PSA (Reflex To Free) (Serial)    PSA (Reflex To Free) (Serial)    finasteride (PROSCAR) 5 mg tablet    doxazosin (CARDURA) 2 mg tablet   7  Wellness examination     8  Erectile disorder due to medical condition in male           Patient Instructions     Patient Instructions   prob ortho referral----generic viagra if testos---50min face-face          Chief Complaint     Chief Complaint   Patient presents with    Follow-up     mm - review labs,     Physical Exam     HM         History of Present Illness       Pt presents with mult issues--ED/assess labs s/p bariatric surg/knee arthr/etc        Review of Systems   Review of Systems   Constitutional: Positive for fatigue  Negative for fever and unexpected weight change  HENT: Negative for congestion, sinus pain and sore throat  Eyes: Negative for visual disturbance  Respiratory: Negative for shortness of breath and wheezing  Cardiovascular: Negative for chest pain and palpitations  Gastrointestinal: Negative for abdominal pain, nausea and vomiting  Genitourinary:        C/o ED   Musculoskeletal: Negative  Negative for arthralgias and myalgias          C/o R knee pain--s/p meniscal surg   Neurological: Negative for syncope, weakness and numbness  Psychiatric/Behavioral: Negative  Negative for confusion, dysphoric mood and suicidal ideas  Current Medications       Current Outpatient Prescriptions:     Biotin 10 MG TABS, Take by mouth, Disp: , Rfl:     calcium carbonate-vitamin D (OSCAL-D) 500 mg-200 units per tablet, Take 1 tablet by mouth 2 (two) times a day with meals, Disp: , Rfl:     Cyanocobalamin (VITAMIN B 12 PO), Take by mouth, Disp: , Rfl:     doxazosin (CARDURA) 2 mg tablet, Take 1 tablet (2 mg total) by mouth daily at bedtime, Disp: 90 tablet, Rfl: 3    finasteride (PROSCAR) 5 mg tablet, Take 1 tablet (5 mg total) by mouth daily, Disp: 90 tablet, Rfl: 3    Glucosamine-Chondroitin (GLUCOSAMINE CHONDR COMPLEX PO), Take by mouth, Disp: , Rfl:     Multiple Vitamin (MULTIVITAMIN) capsule, Take 1 capsule by mouth daily, Disp: , Rfl:     Current Allergies     Allergies as of 10/30/2018    (No Known Allergies)            The following portions of the patient's history were reviewed and updated as appropriate: allergies, current medications, past family history, past medical history, past social history, past surgical history and problem list      Past Medical History:   Diagnosis Date   Jessi Bailey H/O vasectomy     Inguinal hernia, left 02/19/2018    CLEMENTINE AIKEN MD       Past Surgical History:   Procedure Laterality Date    APPENDECTOMY      CHOLECYSTECTOMY      GASTRECTOMY      gastric bypass    [facility]PASS  2012 S  [facility]      HAND SURGERY      KNEE ARTHROSCOPY      NM REPAIR ING HERNIA,5+Y/O,REDUCIBL Left 2/27/2018    Procedure: REPAIR HERNIA INGUINAL WITH MESH;  Surgeon: Diane Reed MD;  Location:  MAIN OR;  Service: General       Family History   Problem Relation Age of Onset    Cancer Mother     Hypertension Mother     Thyroid disease Mother     Hypothyroidism Mother     Diabetes Father         TYPE 2, CONTROLLED    Hypertension Father     COPD Sister     Mental illness Neg Hx     Substance Abuse Neg Hx          Medications have been verified  Objective   /70 (BP Location: Right arm, Patient Position: Sitting, Cuff Size: Large)   Pulse 78   Resp 16   Ht 6' 1" (1 854 m)   Wt 103 kg (227 lb)   SpO2 99%   BMI 29 95 kg/m²        Physical Exam     Physical Exam   Constitutional: He is oriented to person, place, and time  Vital signs are normal  He appears well-developed and well-nourished  HENT:   Right Ear: Ear canal normal  Tympanic membrane is not injected  Left Ear: Ear canal normal  Tympanic membrane is not injected  Nose: Nose normal    Mouth/Throat: Oropharynx is clear and moist    Eyes: Pupils are equal, round, and reactive to light  Conjunctivae and EOM are normal  Right eye exhibits no discharge  Left eye exhibits no discharge  Neck: Normal range of motion  Neck supple  No thyromegaly present  Cardiovascular: Normal rate, regular rhythm and normal heart sounds  No murmur heard  Pulmonary/Chest: Effort normal and breath sounds normal  No respiratory distress  He has no wheezes  Abdominal: Soft  Bowel sounds are normal  He exhibits no distension  There is no tenderness  Musculoskeletal: Normal range of motion  Lymphadenopathy:     He has no cervical adenopathy  Neurological: He is alert and oriented to person, place, and time  He has normal strength and normal reflexes  He is not disoriented  No sensory deficit  Gait normal    Skin: Skin is warm and dry  Psychiatric: He has a normal mood and affect   His speech is normal and behavior is normal  Judgment and thought content normal  Cognition and memory are normal

## 2018-10-31 NOTE — PROGRESS NOTES
HPI:  Magdaleno Lebron is a 50 y o  male here for his yearly health maintenance exam    Patient Active Problem List   Diagnosis    S/P inguinal hernia repair    Vitamin D deficiency    Vitamin B12 deficiency    Benign prostatic hyperplasia with lower urinary tract symptoms    Erectile disorder due to medical condition in male     Past Medical History:   Diagnosis Date    H/O vasectomy     Inguinal hernia, left 02/19/2018    CLEMENTINE AIKEN MD       1  Advanced Directive: n     2  Durable Power of  for Healthcare: n     3  Social History:           Drug and alcohol History: n                  4  Immunizations up to date: y                 Lifestyle:                           Healthy Diet:n                          Alcohol Use:n                          Tobacco Use:n                          Regular exercise:y                          Weight concerns:n                               5  Over the past 2 weeks, how often have you been bothered by the following:              Little interest or pleasure in doing things:n              Felling down, depressed or hopeless:n       Current Outpatient Prescriptions   Medication Sig Dispense Refill    Biotin 10 MG TABS Take by mouth      calcium carbonate-vitamin D (OSCAL-D) 500 mg-200 units per tablet Take 1 tablet by mouth 2 (two) times a day with meals      Cyanocobalamin (VITAMIN B 12 PO) Take by mouth      doxazosin (CARDURA) 2 mg tablet Take 1 tablet (2 mg total) by mouth daily at bedtime 90 tablet 3    finasteride (PROSCAR) 5 mg tablet Take 1 tablet (5 mg total) by mouth daily 90 tablet 3    Glucosamine-Chondroitin (GLUCOSAMINE CHONDR COMPLEX PO) Take by mouth      Multiple Vitamin (MULTIVITAMIN) capsule Take 1 capsule by mouth daily       No current facility-administered medications for this visit        No Known Allergies  Immunization History   Administered Date(s) Administered    Tdap 08/19/2016       Patient Care Team:  Rustam Chavez MD as PCP - General    Review of Systems   Constitutional: Negative for fatigue, fever and unexpected weight change  HENT: Negative for congestion, sinus pressure and sore throat  Eyes: Negative for visual disturbance  Respiratory: Negative for shortness of breath and wheezing  Cardiovascular: Negative for chest pain and palpitations  Gastrointestinal: Negative for abdominal pain, diarrhea, nausea and vomiting  Physical Exam :  Physical Exam   Constitutional: He appears well-developed and well-nourished  No distress  HENT:   Right Ear: Tympanic membrane, external ear and ear canal normal  Tympanic membrane is not injected  Left Ear: Tympanic membrane, external ear and ear canal normal  Tympanic membrane is not injected  Nose: Nose normal    Mouth/Throat: Uvula is midline, oropharynx is clear and moist and mucous membranes are normal    Eyes: Pupils are equal, round, and reactive to light  Conjunctivae are normal    Neck: Normal range of motion  Neck supple  No thyromegaly present  Cardiovascular: Normal rate, regular rhythm and normal heart sounds  No murmur heard  Pulmonary/Chest: Effort normal and breath sounds normal  No respiratory distress  He has no wheezes  Genitourinary:   Genitourinary Comments: Rectal exam reveals lg symmetrical prostate   Lymphadenopathy:     He has no cervical adenopathy  Skin: He is not diaphoretic  Assessment and Plan:  1  Vitamin D deficiency  Vitamin D 25 hydroxy    Vitamin D 25 hydroxy   2  Vitamin B12 deficiency  Vitamin B12    Vitamin B12   3  Fatigue, unspecified type  CBC and differential    Magnesium    Sedimentation rate, automated    Ferritin    Iron    CBC and differential    Magnesium    Sedimentation rate, automated    Ferritin    Iron   4  Hypothyroidism, unspecified type  TSH, 3rd generation    TSH, 3rd generation   5  Encounter for long-term (current) use of medications     6   Benign prostatic hyperplasia with lower urinary tract symptoms, symptom details unspecified  PSA (Reflex To Free) (Serial)    PSA (Reflex To Free) (Serial)    finasteride (PROSCAR) 5 mg tablet    doxazosin (CARDURA) 2 mg tablet   7  Wellness examination     8  Erectile disorder due to medical condition in male     5   Bariatric surgery status         Health Maintenance Due   Topic Date Due    Pneumococcal PPSV23 Highest Risk Adult (1 of 3 - PCV13) 03/19/1989    INFLUENZA VACCINE  07/01/2018

## 2018-11-01 LAB
25(OH)D3+25(OH)D2 SERPL-MCNC: 25.2 NG/ML (ref 30–100)
BASOPHILS # BLD AUTO: 0 X10E3/UL (ref 0–0.2)
BASOPHILS NFR BLD AUTO: 1 %
EOSINOPHIL # BLD AUTO: 0.2 X10E3/UL (ref 0–0.4)
EOSINOPHIL NFR BLD AUTO: 2 %
ERYTHROCYTE [DISTWIDTH] IN BLOOD BY AUTOMATED COUNT: 13.5 % (ref 12.3–15.4)
ERYTHROCYTE [SEDIMENTATION RATE] IN BLOOD BY WESTERGREN METHOD: 2 MM/HR (ref 0–15)
FERRITIN SERPL-MCNC: 204 NG/ML (ref 30–400)
HCT VFR BLD AUTO: 41 % (ref 37.5–51)
HGB BLD-MCNC: 13.7 G/DL (ref 13–17.7)
IMM GRANULOCYTES # BLD: 0 X10E3/UL (ref 0–0.1)
IMM GRANULOCYTES NFR BLD: 0 %
IRON SERPL-MCNC: 99 UG/DL (ref 38–169)
LYMPHOCYTES # BLD AUTO: 2.7 X10E3/UL (ref 0.7–3.1)
LYMPHOCYTES NFR BLD AUTO: 32 %
MAGNESIUM SERPL-MCNC: 2 MG/DL (ref 1.6–2.3)
MCH RBC QN AUTO: 30.2 PG (ref 26.6–33)
MCHC RBC AUTO-ENTMCNC: 33.4 G/DL (ref 31.5–35.7)
MCV RBC AUTO: 91 FL (ref 79–97)
MONOCYTES # BLD AUTO: 0.6 X10E3/UL (ref 0.1–0.9)
MONOCYTES NFR BLD AUTO: 7 %
NEUTROPHILS # BLD AUTO: 4.9 X10E3/UL (ref 1.4–7)
NEUTROPHILS NFR BLD AUTO: 58 %
PLATELET # BLD AUTO: 275 X10E3/UL (ref 150–379)
PSA SERPL-MCNC: 4.4 NG/ML (ref 0–4)
RBC # BLD AUTO: 4.53 X10E6/UL (ref 4.14–5.8)
SL AMB % FREE PSA: 20.9 %
SL AMB PSA, FREE: 0.92 NG/ML
SL AMB REFLEX CRITERIA: ABNORMAL
TSH SERPL DL<=0.005 MIU/L-ACNC: 3.81 UIU/ML (ref 0.45–4.5)
VIT B12 SERPL-MCNC: >2000 PG/ML (ref 232–1245)
WBC # BLD AUTO: 8.4 X10E3/UL (ref 3.4–10.8)

## 2018-11-02 ENCOUNTER — TELEPHONE (OUTPATIENT)
Dept: UROLOGY | Facility: AMBULATORY SURGERY CENTER | Age: 48
End: 2018-11-02

## 2018-11-02 ENCOUNTER — TELEPHONE (OUTPATIENT)
Dept: FAMILY MEDICINE CLINIC | Facility: CLINIC | Age: 48
End: 2018-11-02

## 2018-11-02 NOTE — TELEPHONE ENCOUNTER
Reason for appointment/Complaint/Diagnosis : elevated psa 4 4     Insurance: [employer]     History of Cancer? no                       If yes, what kind? Previous urologist?     no                   Records requested/where? Records in [address] [place] [address] Rd     Outside testing/where? In [facility]     Location Preference for office visit? [facility], patient likes late afternoon appointments if possible- 2:30-3pm are best time frame

## 2018-11-02 NOTE — TELEPHONE ENCOUNTER
----- Message from Davis Holly MD sent at 11/2/2018  7:07 AM EDT -----  Vit D sl low--incr current dose OTC/PSA sl elev prob due to lg prostate--refer urol for consult

## 2018-11-09 ENCOUNTER — TELEPHONE (OUTPATIENT)
Dept: FAMILY MEDICINE CLINIC | Facility: CLINIC | Age: 48
End: 2018-11-09

## 2018-11-09 DIAGNOSIS — R53.83 FATIGUE, UNSPECIFIED TYPE: Primary | ICD-10-CM

## 2018-11-10 LAB
TESTOST FREE SERPL-MCNC: 8.7 PG/ML (ref 6.8–21.5)
TESTOST SERPL-MCNC: 625 NG/DL (ref 264–916)

## 2018-11-14 ENCOUNTER — TELEPHONE (OUTPATIENT)
Dept: FAMILY MEDICINE CLINIC | Facility: CLINIC | Age: 48
End: 2018-11-14

## 2018-11-27 ENCOUNTER — OFFICE VISIT (OUTPATIENT)
Dept: UROLOGY | Facility: HOSPITAL | Age: 48
End: 2018-11-27
Payer: COMMERCIAL

## 2018-11-27 VITALS
BODY MASS INDEX: 29.95 KG/M2 | DIASTOLIC BLOOD PRESSURE: 76 MMHG | HEART RATE: 78 BPM | WEIGHT: 226 LBS | SYSTOLIC BLOOD PRESSURE: 130 MMHG | HEIGHT: 73 IN

## 2018-11-27 DIAGNOSIS — R35.1 BENIGN PROSTATIC HYPERPLASIA WITH NOCTURIA: Primary | ICD-10-CM

## 2018-11-27 DIAGNOSIS — R97.20 ELEVATED PSA: ICD-10-CM

## 2018-11-27 DIAGNOSIS — N40.1 BENIGN PROSTATIC HYPERPLASIA WITH NOCTURIA: Primary | ICD-10-CM

## 2018-11-27 PROCEDURE — 99244 OFF/OP CNSLTJ NEW/EST MOD 40: CPT | Performed by: UROLOGY

## 2018-11-27 RX ORDER — TAMSULOSIN HYDROCHLORIDE 0.4 MG/1
0.4 CAPSULE ORAL
Qty: 90 CAPSULE | Refills: 3 | Status: SHIPPED | OUTPATIENT
Start: 2018-11-27 | End: 2019-01-10

## 2018-11-27 NOTE — PROGRESS NOTES
Assessment/Plan:    Elevated PSA  We discussed prostate cancer screening and PSA at depth  We discussed that the only way to tell whether he has prostate cancer or not is with biopsy  We discussed the procedure of a prostate biopsy including the risks and benefits  We discussed options including proceeding with biopsy or continue to observe the PSA  The patient would like to continue to observe the PSA  Given the large size of his prostate as the likely cause of his high PSA I think this is reasonable and we will repeat this in 3 months  Benign prostatic hyperplasia with lower urinary tract symptoms  I Had a lengthy discussion with the patient and his wife  I would like to switch the medications he is on  I would like to discontinue the finasteride as this will affect the PSA and muddy the kyle in regards to his prostate cancer screening  Finally I would like to switch him from doxazosin to tamsulosin  Diagnoses and all orders for this visit:    Benign prostatic hyperplasia with nocturia  -     tamsulosin (FLOMAX) 0 4 mg; Take 1 capsule (0 4 mg total) by mouth daily with dinner    Elevated PSA  -     PSA Total, Diagnostic; Future  -     PSA Total, Diagnostic          Total visit time was 60 minutes of which over 50% was spent on counseling  Subjective:     Patient ID: Corrie Miranda is a 50 y o  male    22-year-old male presents for evaluation of elevated PSA and lower urinary tract symptoms  Patient was recently started on finasteride and doxazosin by his primary doctor for his lower urinary tract symptoms which included nocturia, frequency, urgency, and poor stream quality  The patient has noticed some improvement of his symptoms on the medication  He denies any problems with side effects  His PSA was checked and found to be elevated at 4 4  He has not had any previous testing  He denies any recent UTI, gross hematuria, or new bone pain  He denies any prostate instrumentation    He denies any family history of prostate cancer  He has no other complaints  The following portions of the patient's history were reviewed and updated as appropriate: allergies, current medications, past family history, past medical history, past social history, past surgical history and problem list     Review of Systems   Constitutional: Negative  HENT: Negative  Eyes: Negative  Respiratory: Negative  Cardiovascular: Negative  Gastrointestinal: Negative  Endocrine: Negative  Genitourinary:        As noted per HPI   Musculoskeletal: Negative  Skin: Negative  Allergic/Immunologic: Negative  Neurological: Negative  Hematological: Negative  Psychiatric/Behavioral: Negative  AUA SYMPTOM SCORE      Most Recent Value   AUA SYMPTOM SCORE   How often have you had a sensation of not emptying your bladder completely after you finished urinating? 1   How often have you had to urinate again less than two hours after you finished urinating? 2   How often have you found you stopped and started again several times when you urinate? 2   How often have you found it difficult to postpone urination? 1   How often have you had a weak urinary stream?  1   How often have you had to push or strain to begin urination? 0   How many times did you most typically get up to urinate from the time you went to bed at night until the time you got up in the morning? 2   Quality of Life: If you were to spend the rest of your life with your urinary condition just the way it is now, how would you feel about that?  2   AUA SYMPTOM SCORE  9          Urinary Incontinence Screening      Most Recent Value   Urinary Incontinence   Urinary Incontinence? No   Incomplete emptying? No   Urinary frequency? No   Urinary urgency? No   Urinary hesitancy? No   Dysuria (painful difficult urination)? No   Nocturia (waking up to use the bathroom)? Yes [1-3 times ]   Straining (having to push to go)?   No   Weak stream?  No   Intermittent stream?  No   Post void dribbling? No          Objective:    Physical Exam   Constitutional: He is oriented to person, place, and time  He appears well-developed and well-nourished  Neck: Normal range of motion  Cardiovascular: Intact distal pulses  Pulmonary/Chest: Effort normal    Abdominal: Soft  Bowel sounds are normal  He exhibits no distension and no mass  There is no tenderness  There is no rebound and no guarding  Genitourinary: Rectum normal and penis normal    Genitourinary Comments: Prostate 50 g, smooth, no nodules, nontender  Musculoskeletal: Normal range of motion  Neurological: He is alert and oriented to person, place, and time  Skin: Skin is warm and dry  Psychiatric: He has a normal mood and affect  Vitals reviewed          Results  Lab Results   Component Value Date    PSA 4 4 (H) 10/31/2018     Lab Results   Component Value Date    K 4 4 10/27/2018    CO2 22 10/27/2018     10/27/2018    BUN 17 10/27/2018     Lab Results   Component Value Date    WBC 8 4 10/31/2018    HGB 13 7 10/31/2018    HCT 41 0 10/31/2018    MCV 91 10/31/2018     10/31/2018       No results found for this or any previous visit (from the past 1 hour(s)) ]

## 2018-11-27 NOTE — ASSESSMENT & PLAN NOTE
I Had a lengthy discussion with the patient and his wife  I would like to switch the medications he is on  I would like to discontinue the finasteride as this will affect the PSA and muddy the kyle in regards to his prostate cancer screening  Finally I would like to switch him from doxazosin to tamsulosin

## 2018-11-28 ENCOUNTER — TELEPHONE (OUTPATIENT)
Dept: FAMILY MEDICINE CLINIC | Facility: CLINIC | Age: 48
End: 2018-11-28

## 2018-11-28 ENCOUNTER — TELEPHONE (OUTPATIENT)
Dept: UROLOGY | Facility: AMBULATORY SURGERY CENTER | Age: 48
End: 2018-11-28

## 2018-11-28 NOTE — TELEPHONE ENCOUNTER
Patient called would like to speak with the nurse about flomax said he didn't want to take it also wanted the doctor to know his father had prostate cancer

## 2018-11-29 NOTE — TELEPHONE ENCOUNTER
Agree with nurse's recommendations  Patient should keep follow up in 3 months with repeat PSA, given recent elevation and strong family history of prostate cancer

## 2018-11-29 NOTE — TELEPHONE ENCOUNTER
Needs appt if TW is to answer ? Regarding a medical consult---if its a simple ?  The phone nurse can screen it

## 2018-11-29 NOTE — TELEPHONE ENCOUNTER
Patient of Dr Ken Humphrey and seen at [facility] office for elevated PSA and BPH  Spoke with patient's wife  She explained that patient is going to continue to take doxazosin instead of starting Flomax  Patient did research and he does not want to risk decreasing his sexual drive by starting Flomax  He also wants to update his family history to include his father as having prostate cancer  This was updated in his family history  Informed wife this update will most likely not change patient's 3 month f/u which would be to recheck PSA and possibly schedule prostate biopsy based on results  Please advise if f/u changes

## 2019-01-10 ENCOUNTER — OFFICE VISIT (OUTPATIENT)
Dept: FAMILY MEDICINE CLINIC | Facility: CLINIC | Age: 49
End: 2019-01-10
Payer: COMMERCIAL

## 2019-01-10 VITALS
HEART RATE: 78 BPM | DIASTOLIC BLOOD PRESSURE: 84 MMHG | OXYGEN SATURATION: 98 % | SYSTOLIC BLOOD PRESSURE: 120 MMHG | HEIGHT: 73 IN | WEIGHT: 224 LBS | BODY MASS INDEX: 29.69 KG/M2

## 2019-01-10 DIAGNOSIS — J40 BRONCHITIS: Primary | ICD-10-CM

## 2019-01-10 PROCEDURE — 99213 OFFICE O/P EST LOW 20 MIN: CPT | Performed by: FAMILY MEDICINE

## 2019-01-10 RX ORDER — AZITHROMYCIN 250 MG/1
250 TABLET, FILM COATED ORAL EVERY 24 HOURS
Qty: 6 TABLET | Refills: 0 | Status: SHIPPED | OUTPATIENT
Start: 2019-01-10 | End: 2019-01-15

## 2019-01-10 RX ORDER — FINASTERIDE 5 MG/1
5 TABLET, FILM COATED ORAL DAILY
COMMUNITY
End: 2019-09-27 | Stop reason: SDUPTHER

## 2019-01-10 RX ORDER — DOXAZOSIN 2 MG/1
2 TABLET ORAL
Qty: 90 TABLET | Refills: 1 | Status: SHIPPED | OUTPATIENT
Start: 2019-01-10 | End: 2019-09-27

## 2019-01-11 NOTE — PROGRESS NOTES
[facility]        NAME: Diana Hester is a 50 y o  male  : 1970    MRN: [id number]  DATE: 2019  TIME: 6:20 AM    Assessment and Plan   Bronchitis [J40]  1  Bronchitis  azithromycin (ZITHROMAX) 250 mg tablet    doxazosin (CARDURA) 2 mg tablet         Patient Instructions     Patient Instructions   See meds          Chief Complaint     Chief Complaint   Patient presents with    Cold Like Symptoms     5-6 days         History of Present Illness       C/o prod cough--1 wk        Review of Systems   Review of Systems   Constitutional: Positive for fatigue  Negative for fever and unexpected weight change  HENT: Negative for congestion, sinus pressure and sore throat  Eyes: Negative for visual disturbance  Respiratory: Positive for cough  Negative for shortness of breath and wheezing  Cardiovascular: Negative for chest pain and palpitations  Gastrointestinal: Negative for abdominal pain, diarrhea, nausea and vomiting           Current Medications       Current Outpatient Prescriptions:     Biotin 10 MG TABS, Take by mouth, Disp: , Rfl:     calcium carbonate-vitamin D (OSCAL-D) 500 mg-200 units per tablet, Take 1 tablet by mouth 2 (two) times a day with meals, Disp: , Rfl:     Cyanocobalamin (VITAMIN B 12 PO), Take by mouth, Disp: , Rfl:     finasteride (PROSCAR) 5 mg tablet, Take 5 mg by mouth daily, Disp: , Rfl:     Glucosamine-Chondroitin (GLUCOSAMINE CHONDR COMPLEX PO), Take by mouth, Disp: , Rfl:     Multiple Vitamin (MULTIVITAMIN) capsule, Take 1 capsule by mouth daily, Disp: , Rfl:     azithromycin (ZITHROMAX) 250 mg tablet, Take 1 tablet (250 mg total) by mouth every 24 hours for 5 days 2 tabs Day 1, then 1 tab daily X 4 days, Disp: 6 tablet, Rfl: 0    doxazosin (CARDURA) 2 mg tablet, Take 1 tablet (2 mg total) by mouth daily at bedtime, Disp: 90 tablet, Rfl: 1    Current Allergies     Allergies as of 01/10/2019    (No Known Allergies)            The following portions of the patient's history were reviewed and updated as appropriate: allergies, current medications, past family history, past medical history, past social history, past surgical history and problem list      Past Medical History:   Diagnosis Date   Vera Leisure H/O vasectomy     Inguinal hernia, left 02/19/2018    CLEMENTINE AIKEN MD       Past Surgical History:   Procedure Laterality Date    APPENDECTOMY      CHOLECYSTECTOMY      GASTRECTOMY      gastric bypass    [facility]  2012    [facility]   HAND SURGERY      KNEE ARTHROSCOPY      [place] REPAIR ING HERNIA,5+Y/O,REDUCIBL Left 2/27/2018    Procedure: REPAIR HERNIA INGUINAL WITH MESH;  Surgeon: Shaun Paniagua MD;  Location:  MAIN OR;  Service: General       Family History   Problem Relation Age of Onset    Cancer Mother     Hypertension Mother     Thyroid disease Mother     Hypothyroidism Mother     Diabetes Father         TYPE 2, CONTROLLED    Hypertension Father     Prostate cancer Father     COPD Sister     Mental illness Neg Hx     Substance Abuse Neg Hx          Medications have been verified  Objective   /84   Pulse 78   Ht 6' 1" (1 854 m)   Wt 102 kg (224 lb)   SpO2 98%   BMI 29 55 kg/m²        Physical Exam     Physical Exam   Constitutional: He appears well-developed and well-nourished  No distress  HENT:   Right Ear: Tympanic membrane, external ear and ear canal normal  Tympanic membrane is not injected  Left Ear: Tympanic membrane, external ear and ear canal normal  Tympanic membrane is not injected  Nose: Nose normal    Mouth/Throat: Uvula is midline, oropharynx is clear and moist and mucous membranes are normal    Eyes: Pupils are equal, round, and reactive to light  Conjunctivae are normal    Neck: Normal range of motion  Neck supple  No thyromegaly present  Cardiovascular: Normal rate, regular rhythm and normal heart sounds  No murmur heard    Pulmonary/Chest: Effort normal and breath sounds normal  No respiratory distress  He has no wheezes  R upper lobe w/ soft rhonchi   Lymphadenopathy:     He has no cervical adenopathy  Skin: He is not diaphoretic

## 2019-02-28 ENCOUNTER — OFFICE VISIT (OUTPATIENT)
Dept: FAMILY MEDICINE CLINIC | Facility: CLINIC | Age: 49
End: 2019-02-28
Payer: COMMERCIAL

## 2019-02-28 VITALS
SYSTOLIC BLOOD PRESSURE: 120 MMHG | HEIGHT: 73 IN | DIASTOLIC BLOOD PRESSURE: 80 MMHG | OXYGEN SATURATION: 96 % | RESPIRATION RATE: 14 BRPM | BODY MASS INDEX: 30.62 KG/M2 | HEART RATE: 74 BPM | WEIGHT: 231 LBS

## 2019-02-28 DIAGNOSIS — Z71.1 FEARED COMPLAINT WITHOUT DIAGNOSIS: Primary | ICD-10-CM

## 2019-02-28 DIAGNOSIS — R39.14 BENIGN PROSTATIC HYPERPLASIA WITH INCOMPLETE BLADDER EMPTYING: ICD-10-CM

## 2019-02-28 DIAGNOSIS — N40.1 BENIGN PROSTATIC HYPERPLASIA WITH INCOMPLETE BLADDER EMPTYING: ICD-10-CM

## 2019-02-28 PROCEDURE — 3008F BODY MASS INDEX DOCD: CPT | Performed by: FAMILY MEDICINE

## 2019-02-28 PROCEDURE — 99213 OFFICE O/P EST LOW 20 MIN: CPT | Performed by: FAMILY MEDICINE

## 2019-02-28 PROCEDURE — 1036F TOBACCO NON-USER: CPT | Performed by: FAMILY MEDICINE

## 2019-02-28 NOTE — PATIENT INSTRUCTIONS
Observe the area by the jaw  Call if any increased swelling or persistent tenderness occurs  Watch for any dental sensitivity  Follow up with Urology for BPH management

## 2019-02-28 NOTE — PROGRESS NOTES
[address]         NAME: Austin Harper is a 50 y o  male  : 1970    MRN: [id number]  DATE: 2019  TIME: 5:08 PM    Assessment and Plan   Feared complaint without diagnosis [Z71 1]  1  Feared complaint without diagnosis     2  Benign prostatic hyperplasia with incomplete bladder emptying         No problem-specific Assessment & Plan notes found for this encounter  Patient Instructions     Patient Instructions   Observe the area by the jaw  Call if any increased swelling or persistent tenderness occurs  Watch for any dental sensitivity  Follow up with Urology for BPH management  Chief Complaint     Chief Complaint   Patient presents with    Lymphadenopathy     swollen lymph nodes on neck         History of Present Illness       Tenderness at side of R jaw-patient had rubbed the side of his face yesterday and thought he felt a lump  It was tender to palpation  It seems to have resolved by this morning  BPH-patient currently doing well with current medications  He did see Urology  Urologist at offered him a prostate biopsy, but had wanted to get a repeat PSA off the finasteride  Patient reluctant to do this due to marked improvement of his symptoms since he has been on it  Review of Systems   Review of Systems   Constitutional: Negative for chills, diaphoresis and fever  HENT: Negative for congestion, dental problem, ear discharge, ear pain, postnasal drip, rhinorrhea, sinus pressure and sore throat  Respiratory: Negative for cough, shortness of breath and wheezing  Cardiovascular: Negative for chest pain and palpitations  Genitourinary: Positive for difficulty urinating  Negative for dysuria, flank pain, hematuria and urgency           Current Medications       Current Outpatient Medications:     Biotin 10 MG TABS, Take by mouth, Disp: , Rfl:     calcium carbonate-vitamin D (OSCAL-D) 500 mg-200 units per tablet, Take 1 tablet by mouth 2 (two) times a day with meals, Disp: , Rfl:     Cyanocobalamin (VITAMIN B 12 PO), Take by mouth, Disp: , Rfl:     doxazosin (CARDURA) 2 mg tablet, Take 1 tablet (2 mg total) by mouth daily at bedtime, Disp: 90 tablet, Rfl: 1    finasteride (PROSCAR) 5 mg tablet, Take 5 mg by mouth daily, Disp: , Rfl:     Glucosamine-Chondroitin (GLUCOSAMINE CHONDR COMPLEX PO), Take by mouth, Disp: , Rfl:     Multiple Vitamin (MULTIVITAMIN) capsule, Take 1 capsule by mouth daily, Disp: , Rfl:     Current Allergies     Allergies as of 02/28/2019    (No Known Allergies)            The following portions of the patient's history were reviewed and updated as appropriate: allergies, current medications, past family history, past medical history, past social history, past surgical history and problem list      Past Medical History:   Diagnosis Date   Parminder Pert H/O vasectomy     Inguinal hernia, left 02/19/2018    CLEMENTINE AIKEN MD       Past Surgical History:   Procedure Laterality Date    APPENDECTOMY      CHOLECYSTECTOMY      GASTRECTOMY      gastric bypass    [facility]   HAND SURGERY      KNEE ARTHROSCOPY      [place] REPAIR ING HERNIA,5+Y/O,REDUCIBL Left 2/27/2018    Procedure: REPAIR HERNIA INGUINAL WITH MESH;  Surgeon: Dimas Humphries MD;  Location: [facility];  Service: General       Family History   Problem Relation Age of Onset    Cancer Mother     Hypertension Mother     Thyroid disease Mother     Hypothyroidism Mother     Diabetes Father         TYPE 2, CONTROLLED    Hypertension Father     Prostate cancer Father     COPD Sister     Mental illness Neg Hx     Substance Abuse Neg Hx          Medications have been verified          Objective   /80 (BP Location: Left arm, Patient Position: Sitting, Cuff Size: Large)   Pulse 74   Resp 14   Ht 6' 1" (1 854 m)   Wt 105 kg (231 lb)   SpO2 96%   BMI 30 48 kg/m²        Physical Exam     Physical Exam Constitutional: He appears well-developed and well-nourished  No distress  HENT:   Head: Normocephalic and atraumatic  Right Ear: Tympanic membrane and external ear normal  No drainage  Left Ear: Tympanic membrane normal  No drainage  Mouth/Throat: No oropharyngeal exudate  Eyes: Conjunctivae and EOM are normal  Right eye exhibits no discharge  Left eye exhibits no discharge  Neck: Normal range of motion  Neck supple  No thyromegaly present  Cardiovascular: Normal rate, regular rhythm and normal heart sounds  Pulmonary/Chest: Effort normal  No respiratory distress  He has no wheezes  He has no rales  Abdominal: Soft  Lymphadenopathy:     He has no cervical adenopathy

## 2019-05-17 ENCOUNTER — TELEPHONE (OUTPATIENT)
Dept: FAMILY MEDICINE CLINIC | Facility: CLINIC | Age: 49
End: 2019-05-17

## 2019-05-17 DIAGNOSIS — L23.7 POISON IVY: Primary | ICD-10-CM

## 2019-05-17 RX ORDER — PREDNISONE 20 MG/1
TABLET ORAL
Qty: 15 TABLET | Refills: 0 | Status: SHIPPED | OUTPATIENT
Start: 2019-05-17 | End: 2019-05-28 | Stop reason: SDUPTHER

## 2019-05-28 ENCOUNTER — OFFICE VISIT (OUTPATIENT)
Dept: FAMILY MEDICINE CLINIC | Facility: CLINIC | Age: 49
End: 2019-05-28
Payer: COMMERCIAL

## 2019-05-28 VITALS
WEIGHT: 233 LBS | DIASTOLIC BLOOD PRESSURE: 78 MMHG | HEART RATE: 97 BPM | HEIGHT: 73 IN | SYSTOLIC BLOOD PRESSURE: 122 MMHG | OXYGEN SATURATION: 98 % | BODY MASS INDEX: 30.88 KG/M2 | TEMPERATURE: 98.1 F

## 2019-05-28 DIAGNOSIS — J20.8 ACUTE BRONCHITIS DUE TO OTHER SPECIFIED ORGANISMS: Primary | ICD-10-CM

## 2019-05-28 DIAGNOSIS — L23.9 ALLERGIC CONTACT DERMATITIS, UNSPECIFIED TRIGGER: ICD-10-CM

## 2019-05-28 DIAGNOSIS — L23.7 POISON IVY: ICD-10-CM

## 2019-05-28 PROCEDURE — 99213 OFFICE O/P EST LOW 20 MIN: CPT | Performed by: FAMILY MEDICINE

## 2019-05-28 RX ORDER — AZITHROMYCIN 250 MG/1
TABLET, FILM COATED ORAL
Qty: 6 TABLET | Refills: 0 | Status: SHIPPED | OUTPATIENT
Start: 2019-05-28 | End: 2019-06-01

## 2019-05-28 RX ORDER — PREDNISONE 20 MG/1
TABLET ORAL
Qty: 15 TABLET | Refills: 0 | Status: SHIPPED | OUTPATIENT
Start: 2019-05-28 | End: 2019-06-18

## 2019-05-29 ENCOUNTER — TELEPHONE (OUTPATIENT)
Dept: UROLOGY | Facility: AMBULATORY SURGERY CENTER | Age: 49
End: 2019-05-29

## 2019-05-29 DIAGNOSIS — Z12.5 PROSTATE CANCER SCREENING: ICD-10-CM

## 2019-05-29 DIAGNOSIS — R97.20 ELEVATED PSA: Primary | ICD-10-CM

## 2019-05-29 LAB — PSA SERPL-MCNC: 2.4 NG/ML (ref 0–4)

## 2019-06-12 ENCOUNTER — TELEPHONE (OUTPATIENT)
Dept: UROLOGY | Facility: AMBULATORY SURGERY CENTER | Age: 49
End: 2019-06-12

## 2019-06-18 ENCOUNTER — OFFICE VISIT (OUTPATIENT)
Dept: FAMILY MEDICINE CLINIC | Facility: CLINIC | Age: 49
End: 2019-06-18
Payer: COMMERCIAL

## 2019-06-18 VITALS
OXYGEN SATURATION: 98 % | DIASTOLIC BLOOD PRESSURE: 68 MMHG | HEIGHT: 73 IN | SYSTOLIC BLOOD PRESSURE: 128 MMHG | WEIGHT: 234.13 LBS | HEART RATE: 78 BPM | TEMPERATURE: 98 F | BODY MASS INDEX: 31.03 KG/M2

## 2019-06-18 DIAGNOSIS — M77.8 RIGHT ELBOW TENDONITIS: Primary | ICD-10-CM

## 2019-06-18 PROCEDURE — 3008F BODY MASS INDEX DOCD: CPT | Performed by: NURSE PRACTITIONER

## 2019-06-18 PROCEDURE — 99213 OFFICE O/P EST LOW 20 MIN: CPT | Performed by: NURSE PRACTITIONER

## 2019-06-26 ENCOUNTER — OFFICE VISIT (OUTPATIENT)
Dept: UROLOGY | Facility: HOSPITAL | Age: 49
End: 2019-06-26
Payer: COMMERCIAL

## 2019-06-26 VITALS
WEIGHT: 239.4 LBS | HEART RATE: 66 BPM | BODY MASS INDEX: 31.73 KG/M2 | DIASTOLIC BLOOD PRESSURE: 76 MMHG | SYSTOLIC BLOOD PRESSURE: 112 MMHG | HEIGHT: 73 IN

## 2019-06-26 DIAGNOSIS — N40.1 BENIGN PROSTATIC HYPERPLASIA WITH INCOMPLETE BLADDER EMPTYING: ICD-10-CM

## 2019-06-26 DIAGNOSIS — R39.14 BENIGN PROSTATIC HYPERPLASIA WITH INCOMPLETE BLADDER EMPTYING: ICD-10-CM

## 2019-06-26 DIAGNOSIS — R97.20 ELEVATED PSA: Primary | ICD-10-CM

## 2019-06-26 PROCEDURE — 99214 OFFICE O/P EST MOD 30 MIN: CPT | Performed by: NURSE PRACTITIONER

## 2019-06-26 RX ORDER — CIPROFLOXACIN 500 MG/1
500 TABLET, FILM COATED ORAL EVERY 12 HOURS SCHEDULED
Qty: 2 TABLET | Refills: 0 | Status: SHIPPED | OUTPATIENT
Start: 2019-06-26 | End: 2019-06-27

## 2019-06-26 RX ORDER — OMEGA-3S/DHA/EPA/FISH OIL/D3 300MG-1000
400 CAPSULE ORAL DAILY
COMMUNITY

## 2019-08-29 ENCOUNTER — TELEPHONE (OUTPATIENT)
Dept: UROLOGY | Facility: AMBULATORY SURGERY CENTER | Age: 49
End: 2019-08-29

## 2019-09-12 ENCOUNTER — TELEPHONE (OUTPATIENT)
Dept: UROLOGY | Facility: MEDICAL CENTER | Age: 49
End: 2019-09-12

## 2019-09-12 NOTE — TELEPHONE ENCOUNTER
Pt managed by Darryl Simons called regarding trus biopsy scheduled for 09/13/19 he had taken a  childrens chewable multi vitamin daily up until yesterday 09/11/19 will this effect procedure needing to be rescheduled?

## 2019-09-13 ENCOUNTER — PROCEDURE VISIT (OUTPATIENT)
Dept: UROLOGY | Facility: HOSPITAL | Age: 49
End: 2019-09-13
Payer: COMMERCIAL

## 2019-09-13 VITALS
BODY MASS INDEX: 29.95 KG/M2 | HEART RATE: 64 BPM | SYSTOLIC BLOOD PRESSURE: 110 MMHG | WEIGHT: 226 LBS | DIASTOLIC BLOOD PRESSURE: 80 MMHG | HEIGHT: 73 IN

## 2019-09-13 DIAGNOSIS — R97.20 ELEVATED PSA: Primary | ICD-10-CM

## 2019-09-13 PROCEDURE — 88344 IMHCHEM/IMCYTCHM EA MLT ANTB: CPT | Performed by: PATHOLOGY

## 2019-09-13 PROCEDURE — G0416 PROSTATE BIOPSY, ANY MTHD: HCPCS | Performed by: PATHOLOGY

## 2019-09-13 PROCEDURE — 55700 PR BIOPSY OF PROSTATE,NEEDLE/PUNCH: CPT | Performed by: UROLOGY

## 2019-09-13 PROCEDURE — 76872 US TRANSRECTAL: CPT | Performed by: UROLOGY

## 2019-09-13 PROCEDURE — 76942 ECHO GUIDE FOR BIOPSY: CPT | Performed by: UROLOGY

## 2019-09-13 NOTE — PROGRESS NOTES
Biopsy prostate     Date/Time 9/13/2019 9:43 AM     Performed by  Sadia Romero MD     Authorized by Sadia Romero MD            Office TRUS-guided Prostate Biopsy Procedure Note    Indication    Elevated PSA    Informed consent   The risks, benefits and alternatives to TRUS-guided prostate biopsy were conveyed to the patient prior to performing the procedure  A discussion of the risks of the procedure included, but was not limited to: pain, hematuria, hematochezia, hematospermia, infection, and the possibility of a non-diagnostic biopsy  The patient was given the opportunity to have his questions answered and there was no perceived barrier to education  Prophylaxis   The patient underwent bowel prep and startedCiprofloxacin yesterday and will continue to complete antibiotic course through tomorrow  Local anesthesia  Topical 2% lidocaine jelly was applied liberally to the anus and rectum and allowed to dwell for at least 5 min prior to starting the procedure  After insertion of the TRUS probe, 20 mL of 1% lidocaine solution was injected with ultrasound guidance at the prostatic apex and at the junction of the prostate and seminal vesicles  The anesthetic was allowed to dwell for at least 2 minutes prior to biopsy  Transrectal ultrasonography  The patient was placed in the left lateral decubitus position  After an attentive digital rectal examination, a 7 5 mHz sidefire ultrasound probe was gently inserted into the rectum and biplanar imaging of the prostate was done with the findings noted below  Images were taken of any abnormal findings and also to document prostate size  Ultrasound Findings    The seminal vesicles were normal in size, shape, and echotexture  No mass or cyst was identified  The prostate had heterogeneous echogenicity with no discrete masses  Prostate volume was calculated as 74cm3      TRUS-guided needle biopsy  Using an 18 gauge biopsy needle and ultrasound guidance, the following biopsies were taken:    1 core(s) from the left lateral base  1 core(s) from the left lateral mid-gland  1 core(s) from the right middle base  1 core(s) from the right lateral base  1 core(s) from the left lateral mid-gland  1 core(s) from the left middle mid-gland  1 core(s) from the right middle mid-gland  1 core(s) from the right lateral mid-gland  1 core(s) from the left lateral apex  1 core(s) from the left middle apex  1 core(s) from the right middle apex  1 core(s) from the right lateral apex    Total number of cores: 12                Complications  There were no procedural complications  Disposition  The patient was dismissed to home after 30 minutes of observation in stable condition  Post-procedure instructions: Today he underwent an uncomplicated transrectal ultrasound-guided biopsy of the prostate, following a periprosthetic nerve block  I reviewed the normal postprocedure a course including bleeding per recutm, hematuria, and hematospermia  I instructed him to complete his course of antibiotics as prescribed  Instructed him to call with fever greater than 101, chills, nausea, vomiting, poorly controlled pain  His followup was scheduled in approximately 2 weeks' time to review the pathology

## 2019-09-18 ENCOUNTER — TELEPHONE (OUTPATIENT)
Dept: FAMILY MEDICINE CLINIC | Facility: CLINIC | Age: 49
End: 2019-09-18

## 2019-09-18 ENCOUNTER — TELEPHONE (OUTPATIENT)
Dept: UROLOGY | Facility: MEDICAL CENTER | Age: 49
End: 2019-09-18

## 2019-09-18 NOTE — TELEPHONE ENCOUNTER
Patient of Dr Mago Long seen in [facility]  Calling to r/s biopsy results appointment but I was unable to find an alternate spot within two weeks  Kindly take a look to see if theres another appointment spot       She can be reached at [phone]

## 2019-09-18 NOTE — TELEPHONE ENCOUNTER
Called pt to see if he could do the 9/24 @ 11:45 if he can we need to make the 11:30 a 15min appt to fit him in at 11:45

## 2019-09-19 NOTE — TELEPHONE ENCOUNTER
Called pt again end of day yesterday and he is talking to his wife again, if we do not hear back from them they will probably be keeping the 9/27 appt

## 2019-09-27 ENCOUNTER — OFFICE VISIT (OUTPATIENT)
Dept: UROLOGY | Facility: HOSPITAL | Age: 49
End: 2019-09-27
Payer: COMMERCIAL

## 2019-09-27 VITALS
HEIGHT: 73 IN | WEIGHT: 230.6 LBS | SYSTOLIC BLOOD PRESSURE: 100 MMHG | HEART RATE: 60 BPM | DIASTOLIC BLOOD PRESSURE: 70 MMHG | BODY MASS INDEX: 30.56 KG/M2

## 2019-09-27 DIAGNOSIS — R39.14 BENIGN PROSTATIC HYPERPLASIA WITH INCOMPLETE BLADDER EMPTYING: ICD-10-CM

## 2019-09-27 DIAGNOSIS — R97.20 ELEVATED PSA: Primary | ICD-10-CM

## 2019-09-27 DIAGNOSIS — N40.1 BENIGN PROSTATIC HYPERPLASIA WITH INCOMPLETE BLADDER EMPTYING: ICD-10-CM

## 2019-09-27 PROCEDURE — 99213 OFFICE O/P EST LOW 20 MIN: CPT | Performed by: UROLOGY

## 2019-09-27 RX ORDER — FINASTERIDE 5 MG/1
5 TABLET, FILM COATED ORAL DAILY
Qty: 100 TABLET | Refills: 3 | Status: SHIPPED | OUTPATIENT
Start: 2019-09-27 | End: 2020-11-04 | Stop reason: SDUPTHER

## 2019-09-27 RX ORDER — TAMSULOSIN HYDROCHLORIDE 0.4 MG/1
0.4 CAPSULE ORAL
Qty: 100 CAPSULE | Refills: 3 | Status: SHIPPED | OUTPATIENT
Start: 2019-09-27 | End: 2020-11-04 | Stop reason: SDUPTHER

## 2019-09-29 NOTE — ASSESSMENT & PLAN NOTE
Finasteride and tamsulosin renewed  We briefly discussed surgical options  He will follow up in 1 year

## 2019-09-29 NOTE — PROGRESS NOTES
Assessment/Plan:    Benign prostatic hyperplasia with lower urinary tract symptoms  Finasteride and tamsulosin renewed  We briefly discussed surgical options  He will follow up in 1 year  Elevated PSA  We reviewed the biopsy results  I was happy to report there is no evidence of malignancy  He will follow up in 1 year with repeat PSA testing  We discussed that if his PSA continues to rise we will need to proceed with MRI  Diagnoses and all orders for this visit:    Elevated PSA  -     PSA Total, Diagnostic; Future  -     PSA Total, Diagnostic    Benign prostatic hyperplasia with incomplete bladder emptying  -     finasteride (PROSCAR) 5 mg tablet; Take 1 tablet (5 mg total) by mouth daily  -     tamsulosin (FLOMAX) 0 4 mg; Take 1 capsule (0 4 mg total) by mouth daily with dinner          Total visit time was 15 minutes of which over 50% was spent on counseling  Subjective:     Patient ID: Travis Aranda is a 52 y o  male    26-year-old male presents for follow-up after prostate biopsy  He did well after the procedure and his only complaint is still of some mild intermittent hematuria  He is here with his wife to discuss results of the biopsy  The following portions of the patient's history were reviewed and updated as appropriate: allergies, current medications, past family history, past medical history, past social history, past surgical history and problem list     Review of Systems   Constitutional: Negative  HENT: Negative  Eyes: Negative  Respiratory: Negative  Cardiovascular: Negative  Gastrointestinal: Negative  Endocrine: Negative  Genitourinary:        As noted per HPI   Musculoskeletal: Negative  Skin: Negative  Allergic/Immunologic: Negative  Neurological: Negative  Hematological: Negative  Psychiatric/Behavioral: Negative  Objective:    Physical Exam   Constitutional: He is oriented to person, place, and time   He appears well-developed and well-nourished  Neck: Normal range of motion  Cardiovascular: Intact distal pulses  Pulmonary/Chest: Effort normal    Abdominal: Soft  Bowel sounds are normal  He exhibits no distension and no mass  There is no tenderness  There is no rebound and no guarding  Musculoskeletal: Normal range of motion  Neurological: He is alert and oriented to person, place, and time  Skin: Skin is warm and dry  Psychiatric: He has a normal mood and affect  Vitals reviewed          Results  Lab Results   Component Value Date    PSA 2 4 05/28/2019    PSA 4 4 (H) 10/31/2018     Lab Results   Component Value Date    K 4 4 10/27/2018    CO2 22 10/27/2018     10/27/2018    BUN 17 10/27/2018    CREATININE 0 85 10/27/2018     Lab Results   Component Value Date    WBC 8 4 10/31/2018    HGB 13 7 10/31/2018    HCT 41 0 10/31/2018    MCV 91 10/31/2018     10/31/2018       No results found for this or any previous visit (from the past 1 hour(s)) ]

## 2019-09-29 NOTE — ASSESSMENT & PLAN NOTE
We reviewed the biopsy results  I was happy to report there is no evidence of malignancy  He will follow up in 1 year with repeat PSA testing  We discussed that if his PSA continues to rise we will need to proceed with MRI

## 2019-10-16 ENCOUNTER — TELEPHONE (OUTPATIENT)
Dept: UROLOGY | Facility: MEDICAL CENTER | Age: 49
End: 2019-10-16

## 2019-10-16 NOTE — TELEPHONE ENCOUNTER
Patient of Dr Glenn Alcantar seen in the [facility] office  Patient advised that he got billed for his procedure on 9/13/19  Patient advised that he spoke with his insurance and advised that we did not get a prior authorization to do a procedure  Patient would like to know what he should do  Please advise

## 2019-10-17 NOTE — TELEPHONE ENCOUNTER
Karla Shah, did everything on my part  Being told patient needs a Prior Auth  for the visit  Insurance made payments it looks like the insurance paid    It looks the patients deductible

## 2019-10-18 NOTE — TELEPHONE ENCOUNTER
I called and spoke with Анна Walsh to let him know we are still working on this and will follow up with him on Monday

## 2019-11-19 NOTE — TELEPHONE ENCOUNTER
I spoke with patients wife regarding the bill they received from his prostate biopsy  The bill they received for the ultrasound was towards there max out of pocket and the bill they received from the lab for the pathology she would have to contact them regarding that since we can not handle that bill  She called and spoke with the insurance regarding the lab bill and the insurance told her she would need a letter from the doctor explaining to why the pathology was sent to [facility] and not [facility] which is where is should have been sent to  If Quang Salazar is ok with writing something up for this please let me know    Thanks  [facility]

## 2019-12-01 NOTE — TELEPHONE ENCOUNTER
Discussed with leadership team, please contact Randi Bermudez in lab billing to problem-solve   Thank you

## 2019-12-05 NOTE — TELEPHONE ENCOUNTER
Matteo Estrada,    This is   I'm not really sure what this is  Patient had a prostate biopsy done on 9/13/2019 and pathology was sent to the wrong lab  The samples were sent to a [facility]'s laboratory, but should of gone to [place]   Patient is now receiving a bill for pathology  I received your name within this message from my manager to problem solve

## 2019-12-11 NOTE — TELEPHONE ENCOUNTER
KAVITHA  Received call from Vickey in billing to discuss the billing process  Patient is being informed that this is in process    Thank you

## 2020-01-21 NOTE — TELEPHONE ENCOUNTER
Message left on patients Cell # voicemail -- as per Dr Becky Walker, any ?'s to call office        ----- Message from Juan Segundo MD sent at 11/12/2018  6:00 AM EST -----  JAMESON landaverde
21-Jan-2020

## 2020-02-24 ENCOUNTER — OFFICE VISIT (OUTPATIENT)
Dept: FAMILY MEDICINE CLINIC | Facility: CLINIC | Age: 50
End: 2020-02-24
Payer: COMMERCIAL

## 2020-02-24 VITALS
WEIGHT: 228.4 LBS | DIASTOLIC BLOOD PRESSURE: 76 MMHG | BODY MASS INDEX: 30.27 KG/M2 | HEART RATE: 68 BPM | HEIGHT: 73 IN | SYSTOLIC BLOOD PRESSURE: 126 MMHG | OXYGEN SATURATION: 95 %

## 2020-02-24 DIAGNOSIS — J01.00 ACUTE NON-RECURRENT MAXILLARY SINUSITIS: Primary | ICD-10-CM

## 2020-02-24 PROCEDURE — 99214 OFFICE O/P EST MOD 30 MIN: CPT | Performed by: FAMILY MEDICINE

## 2020-02-24 PROCEDURE — 3008F BODY MASS INDEX DOCD: CPT | Performed by: FAMILY MEDICINE

## 2020-02-24 RX ORDER — AZITHROMYCIN 250 MG/1
TABLET, FILM COATED ORAL
Qty: 6 TABLET | Refills: 0 | Status: SHIPPED | OUTPATIENT
Start: 2020-02-24 | End: 2020-02-28

## 2020-02-24 NOTE — PROGRESS NOTES
[facility]        NAME: Barbara Sharma is a 52 y o  male  : 1970    MRN: [id number]  DATE: 2020  TIME: 1:12 PM    Assessment and Plan   Acute non-recurrent maxillary sinusitis [J01 00]  1  Acute non-recurrent maxillary sinusitis  azithromycin (ZITHROMAX) 250 mg tablet         Patient Instructions     Patient Instructions   Start Z pk if cont          Chief Complaint     Chief Complaint   Patient presents with    Cold Like Symptoms     sinus congestion, post-nasal cough x 1week         History of Present Illness       C/o sinus infection 5 days---no resp OTC meds-mod severity      Review of Systems   Review of Systems   Constitutional: Negative  HENT: Positive for postnasal drip, sinus pressure, sinus pain and sore throat  Eyes: Negative  Respiratory: Positive for cough  Cardiovascular: Negative  Gastrointestinal: Negative            Current Medications       Current Outpatient Medications:     azithromycin (ZITHROMAX) 250 mg tablet, 2 tabs Day 1, then 1 tab daily X 4 days, Disp: 6 tablet, Rfl: 0    Biotin 10 MG TABS, Take by mouth, Disp: , Rfl:     calcium carbonate-vitamin D (OSCAL-D) 500 mg-200 units per tablet, Take 1 tablet by mouth 2 (two) times a day with meals, Disp: , Rfl:     cholecalciferol (VITAMIN D3) 400 units tablet, Take 400 Units by mouth daily, Disp: , Rfl:     Cyanocobalamin (VITAMIN B 12 PO), Take by mouth, Disp: , Rfl:     finasteride (PROSCAR) 5 mg tablet, Take 1 tablet (5 mg total) by mouth daily, Disp: 100 tablet, Rfl: 3    Glucosamine-Chondroitin (GLUCOSAMINE CHONDR COMPLEX PO), Take by mouth, Disp: , Rfl:     Multiple Vitamin (MULTIVITAMIN) capsule, Take 1 capsule by mouth daily, Disp: , Rfl:     tamsulosin (FLOMAX) 0 4 mg, Take 1 capsule (0 4 mg total) by mouth daily with dinner, Disp: 100 capsule, Rfl: 3    Current Allergies     Allergies as of 2020    (No Known Allergies)            The following portions of the patient's history were reviewed and updated as appropriate: allergies, current medications, past family history, past medical history, past social history, past surgical history and problem list      Past Medical History:   Diagnosis Date   [facility] H/O vasectomy     Inguinal hernia, left 02/19/2018    CLEMENTINE AIKEN MD       Past Surgical History:   Procedure Laterality Date    APPENDECTOMY      CHOLECYSTECTOMY      GASTRECTOMY      gastric bypass    [facility]   HAND SURGERY      KNEE ARTHROSCOPY      VA REPAIR ING HERNIA,5+Y/O,REDUCIBL Left 2/27/2018    Procedure: REPAIR HERNIA INGUINAL WITH MESH;  Surgeon: Aaron Talbot MD;  Location:  MAIN OR;  Service: General       Family History   Problem Relation Age of Onset    Cancer Mother     Hypertension Mother     Thyroid disease Mother     Hypothyroidism Mother     Diabetes Father         TYPE 2, CONTROLLED    Hypertension Father     Prostate cancer Father     COPD Sister     Mental illness Neg Hx     Substance Abuse Neg Hx          Medications have been verified  Objective   /76   Pulse 68   Ht 6' 1" (1 854 m)   Wt 104 kg (228 lb 6 4 oz)   SpO2 95%   BMI 30 13 kg/m²        Physical Exam     Physical Exam   Constitutional: He is oriented to person, place, and time  He appears well-developed and well-nourished  No distress  HENT:   Head: Normocephalic and atraumatic  Right Ear: Tympanic membrane, external ear and ear canal normal    Left Ear: Tympanic membrane, external ear and ear canal normal    Nose: Rhinorrhea present  No epistaxis  Right sinus exhibits maxillary sinus tenderness  Left sinus exhibits maxillary sinus tenderness  Mouth/Throat: Uvula is midline, oropharynx is clear and moist and mucous membranes are normal    Cardiovascular: Normal rate, regular rhythm and normal heart sounds  Exam reveals no gallop and no friction rub  No murmur heard    Pulmonary/Chest: Effort normal and breath sounds normal  No respiratory distress  He has no wheezes  He has no rales  Abdominal: He exhibits no distension  Musculoskeletal: Normal range of motion  Neurological: He is alert and oriented to person, place, and time  Skin: He is not diaphoretic  Psychiatric: He has a normal mood and affect  His behavior is normal  Judgment and thought content normal    Nursing note and vitals reviewed  BMI Counseling: Body mass index is 30 13 kg/m²  The BMI is above normal  Nutrition recommendations include reducing portion sizes

## 2020-04-15 ENCOUNTER — TELEMEDICINE (OUTPATIENT)
Dept: FAMILY MEDICINE CLINIC | Facility: CLINIC | Age: 50
End: 2020-04-15
Payer: COMMERCIAL

## 2020-04-15 DIAGNOSIS — Z20.828 EXPOSURE TO SARS-ASSOCIATED CORONAVIRUS: ICD-10-CM

## 2020-04-15 DIAGNOSIS — Z20.828 EXPOSURE TO SARS-ASSOCIATED CORONAVIRUS: Primary | ICD-10-CM

## 2020-04-15 PROCEDURE — 87635 SARS-COV-2 COVID-19 AMP PRB: CPT

## 2020-04-15 PROCEDURE — 99441 PR PHYS/QHP TELEPHONE EVALUATION 5-10 MIN: CPT | Performed by: FAMILY MEDICINE

## 2020-04-16 ENCOUNTER — TELEPHONE (OUTPATIENT)
Dept: FAMILY MEDICINE CLINIC | Facility: CLINIC | Age: 50
End: 2020-04-16

## 2020-04-16 LAB — SARS-COV-2 RNA SPEC QL NAA+PROBE: NOT DETECTED

## 2020-08-11 ENCOUNTER — TELEPHONE (OUTPATIENT)
Dept: UROLOGY | Facility: MEDICAL CENTER | Age: 50
End: 2020-08-11

## 2020-08-11 NOTE — TELEPHONE ENCOUNTER
The patient was seen by Dr Patrick Matute  Patient had a follow-up from a procedure with no post op period (Prostate Biopsy on 9/13/2019)  Level of Service (LOS) is coded correctly as a established patient as the lowest level seeing a provider (level 3)  This visit is coded correctly, and will not be changed

## 2020-08-11 NOTE — TELEPHONE ENCOUNTER
Patient of Dr Michaela Zhao seen in [facility] office  Patient wife calling due to code issue for 09/27/2019  Wife requesting we change appointment from office visit to follow up so insurance can pay the claim  Please advise

## 2020-10-14 ENCOUNTER — TELEPHONE (OUTPATIENT)
Dept: UROLOGY | Facility: AMBULATORY SURGERY CENTER | Age: 50
End: 2020-10-14

## 2020-11-04 DIAGNOSIS — N40.1 BENIGN PROSTATIC HYPERPLASIA WITH INCOMPLETE BLADDER EMPTYING: ICD-10-CM

## 2020-11-04 DIAGNOSIS — R39.14 BENIGN PROSTATIC HYPERPLASIA WITH INCOMPLETE BLADDER EMPTYING: ICD-10-CM

## 2020-11-04 RX ORDER — TAMSULOSIN HYDROCHLORIDE 0.4 MG/1
0.4 CAPSULE ORAL
Qty: 90 CAPSULE | Refills: 0 | Status: SHIPPED | OUTPATIENT
Start: 2020-11-04 | End: 2020-12-11 | Stop reason: SDUPTHER

## 2020-11-04 RX ORDER — FINASTERIDE 5 MG/1
5 TABLET, FILM COATED ORAL DAILY
Qty: 90 TABLET | Refills: 0 | Status: SHIPPED | OUTPATIENT
Start: 2020-11-04 | End: 2020-12-11 | Stop reason: SDUPTHER

## 2020-12-11 RX ORDER — FINASTERIDE 5 MG/1
5 TABLET, FILM COATED ORAL DAILY
Qty: 90 TABLET | Refills: 0 | Status: SHIPPED | OUTPATIENT
Start: 2020-12-11 | End: 2021-01-07 | Stop reason: SDUPTHER

## 2020-12-11 RX ORDER — TAMSULOSIN HYDROCHLORIDE 0.4 MG/1
0.4 CAPSULE ORAL
Qty: 90 CAPSULE | Refills: 0 | Status: SHIPPED | OUTPATIENT
Start: 2020-12-11 | End: 2021-01-07 | Stop reason: SDUPTHER

## 2021-01-04 ENCOUNTER — TELEPHONE (OUTPATIENT)
Dept: UROLOGY | Facility: MEDICAL CENTER | Age: 51
End: 2021-01-04

## 2021-01-04 DIAGNOSIS — R97.20 ELEVATED PSA: Primary | ICD-10-CM

## 2021-01-04 NOTE — TELEPHONE ENCOUNTER
Patient of Dr Genaro Antonio at [place]    Patient has an appointment 01/07 with Miracle Barba and needs a new PSA  Order sent to [facility] in [place]

## 2021-01-05 NOTE — TELEPHONE ENCOUNTER
Spoke to [employer] in [facility] to obtain fax number  Noted as #[phone] as per   PSA ordered and faxed to number provided  LMOM, as per communication consent, informing Patient of above

## 2021-01-07 ENCOUNTER — TELEPHONE (OUTPATIENT)
Dept: FAMILY MEDICINE CLINIC | Facility: CLINIC | Age: 51
End: 2021-01-07

## 2021-01-07 ENCOUNTER — OFFICE VISIT (OUTPATIENT)
Dept: UROLOGY | Facility: HOSPITAL | Age: 51
End: 2021-01-07
Payer: COMMERCIAL

## 2021-01-07 VITALS
HEART RATE: 73 BPM | SYSTOLIC BLOOD PRESSURE: 118 MMHG | WEIGHT: 238 LBS | BODY MASS INDEX: 31.54 KG/M2 | HEIGHT: 73 IN | DIASTOLIC BLOOD PRESSURE: 80 MMHG

## 2021-01-07 DIAGNOSIS — Z12.5 SCREENING FOR PROSTATE CANCER: ICD-10-CM

## 2021-01-07 DIAGNOSIS — Z80.42 FAMILY HISTORY OF PROSTATE CANCER IN FATHER: ICD-10-CM

## 2021-01-07 DIAGNOSIS — N40.1 BENIGN PROSTATIC HYPERPLASIA WITH INCOMPLETE BLADDER EMPTYING: ICD-10-CM

## 2021-01-07 DIAGNOSIS — N52.9 ERECTILE DYSFUNCTION, UNSPECIFIED ERECTILE DYSFUNCTION TYPE: Primary | ICD-10-CM

## 2021-01-07 DIAGNOSIS — R39.14 BENIGN PROSTATIC HYPERPLASIA WITH INCOMPLETE BLADDER EMPTYING: ICD-10-CM

## 2021-01-07 PROCEDURE — 3008F BODY MASS INDEX DOCD: CPT | Performed by: NURSE PRACTITIONER

## 2021-01-07 PROCEDURE — 99214 OFFICE O/P EST MOD 30 MIN: CPT | Performed by: NURSE PRACTITIONER

## 2021-01-07 PROCEDURE — 1036F TOBACCO NON-USER: CPT | Performed by: NURSE PRACTITIONER

## 2021-01-07 RX ORDER — FINASTERIDE 5 MG/1
5 TABLET, FILM COATED ORAL DAILY
Qty: 90 TABLET | Refills: 3 | Status: SHIPPED | OUTPATIENT
Start: 2021-01-07 | End: 2021-12-20 | Stop reason: SDUPTHER

## 2021-01-07 RX ORDER — SILDENAFIL 50 MG/1
100 TABLET, FILM COATED ORAL AS NEEDED
Qty: 21 TABLET | Refills: 3 | Status: SHIPPED | OUTPATIENT
Start: 2021-01-07

## 2021-01-07 RX ORDER — TAMSULOSIN HYDROCHLORIDE 0.4 MG/1
0.4 CAPSULE ORAL
Qty: 90 CAPSULE | Refills: 3 | Status: SHIPPED | OUTPATIENT
Start: 2021-01-07 | End: 2021-12-20 | Stop reason: SDUPTHER

## 2021-01-07 NOTE — TELEPHONE ENCOUNTER
Valid  Referral #: [id number]  Effective: 01/07/2021  Expires: 04/06/2021    Referral [id number] has been successfully submitted  [facility]   [address] [facility]   [address] [id number]   PATIENT'S INSURANCE  Member ID: [id number]  PRIMARY CARE PHYSICIAN  [facility]   NPI: [id number]   [facility]  [facility]   NPI: [id number]  [facility]   [place], [address]   To  [facility]  NPI: [id number]  [address], [address], [place], [address] [facility]   ,   Service Type: Medical Care (Consult and Treat)   Place of Service: Office   Note to Patient   This referral is valid at any location for the above group

## 2021-01-07 NOTE — PROGRESS NOTES
1/7/2021    Hermelindoscarlet Tapia  1970  [id number]      Assessment  -BPH with lower urinary tract symptoms  -History of elevated PSA s/p negative prostate biopsy (9/2019)  -Erectile dysfunction    Discussion/Plan  Idania Tovar is a 48 y o  male being managed by our office    1  BPH with lower urinary tract symptoms- patient is doing well without any changes to his urinary pattern  He will remain on tamsulosin 0 4 mg HS and finasteride 5 mg daily  Prescription refills were electronically sent to his pharmacy  2  History of elevated PSA s/p negative prostate biopsy (9/2019)- unfortunately, patient did not obtain PSA prior to today's office visit  Advised patient to obtain PSA in the next few weeks  Educated patient on potential false elevation  There were no significant findings noted on digital rectal examination today  We will continue annual prostate cancer screening given his father's history of prostate cancer  3  Erectile dysfunction- we discussed trialing generic Viagra for management of ED  Prescription for 100 mg as needed was sent to his pharmacy  Reviewed mechanism of action, potential side effects, and administration instructions    Follow up in 1 year with PSA and JODIE, or sooner if needed  He was instructed to call if any issues    -All questions answered, patient agrees with plan      History of Present Illness  48 y o  male with a history of BPH, ED, and elevated PSA presents today for follow up  Patient remains on tamsulosin 0 4 mg HS and finasteride 5 mg daily  He reports episodes of incomplete bladder emptying if he forgets to take his medication  Patient denies any gross hematuria or dysuria  He is status post negative prostate biopsy performed in September 2019 for PSA of 2 4 (4 8 corrected with finasteride) and his father's history of prostate cancer  He also reports difficulty maintaining an erection and is requesting oral medication  He denies any changes to his overall health      Review of Systems  Review of Systems   Constitutional: Negative  HENT: Negative  Respiratory: Negative  Cardiovascular: Negative  Gastrointestinal: Negative  Genitourinary: Negative for decreased urine volume, difficulty urinating, dysuria, flank pain, frequency, hematuria and urgency  Musculoskeletal: Negative  Skin: Negative  Neurological: Negative  Psychiatric/Behavioral: Negative  Past Medical History  Past Medical History:   Diagnosis Date   [facility] H/O vasectomy     Inguinal hernia, left 02/19/2018    CLEMENTINE AIKEN MD       Past Social History  Past Surgical History:   Procedure Laterality Date    APPENDECTOMY      CHOLECYSTECTOMY      GASTRECTOMY      gastric bypass    [facility]PASS  [facility]      HAND SURGERY      KNEE ARTHROSCOPY      VA REPAIR ING HERNIA,5+Y/O,REDUCIBL Left 2/27/2018    Procedure: REPAIR HERNIA INGUINAL WITH MESH;  Surgeon: Emilia Lucas MD;  Location: [facility] OR;  Service: General       Past Family History  Family History   Problem Relation Age of Onset    Cancer Mother     Hypertension Mother     Thyroid disease Mother     Hypothyroidism Mother     Diabetes Father         TYPE 2, CONTROLLED    Hypertension Father     Prostate cancer Father     COPD Sister     Mental illness Neg Hx     Substance Abuse Neg Hx        Past Social history  Social History     Socioeconomic History    Marital status: /Civil Union     Spouse name: Not on file    Number of children: Not on file    Years of education: Not on file    Highest education level: Not on file   Occupational History    Not on file   Social Needs    Financial resource strain: Not on file    Food insecurity     Worry: Not on file     Inability: Not on file    Transportation needs     Medical: Not on file     Non-medical: Not on file   Tobacco Use    Smoking status: Never Smoker    Smokeless tobacco: Current User     Types: Snuff    Tobacco comment: one can/day, FORMER SMOKER AS PER [employer]   Substance and Sexual Activity    Alcohol use: Yes     Comment: occasionally    Drug use: No    Sexual activity: Yes   Lifestyle    Physical activity     Days per week: Not on file     Minutes per session: Not on file    Stress: Not on file   Relationships    Social connections     Talks on phone: Not on file     Gets together: Not on file     Attends Yazidi service: Not on file     Active member of club or organization: Not on file     Attends meetings of clubs or organizations: Not on file     Relationship status: Not on file    Intimate partner violence     Fear of current or ex partner: Not on file     Emotionally abused: Not on file     Physically abused: Not on file     Forced sexual activity: Not on file   Other Topics Concern    Not on file   Social History Narrative    Not on file       Current Medications  Current Outpatient Medications   Medication Sig Dispense Refill    Biotin 10 MG TABS Take by mouth      calcium carbonate-vitamin D (OSCAL-D) 500 mg-200 units per tablet Take 1 tablet by mouth 2 (two) times a day with meals      cholecalciferol (VITAMIN D3) 400 units tablet Take 400 Units by mouth daily      Cyanocobalamin (VITAMIN B 12 PO) Take by mouth      finasteride (PROSCAR) 5 mg tablet Take 1 tablet (5 mg total) by mouth daily 90 tablet 3    Glucosamine-Chondroitin (GLUCOSAMINE CHONDR COMPLEX PO) Take by mouth      Multiple Vitamin (MULTIVITAMIN) capsule Take 1 capsule by mouth daily      tamsulosin (FLOMAX) 0 4 mg Take 1 capsule (0 4 mg total) by mouth daily with dinner 90 capsule 3    sildenafil (VIAGRA) 50 MG tablet Take 2 tablets (100 mg total) by mouth as needed for erectile dysfunction 21 tablet 3     No current facility-administered medications for this visit  Allergies  No Known Allergies    Past Medical History, Social History, Family History, medications and allergies were reviewed      Vitals  Vitals:    01/07/21 1450 BP: 118/80   BP Location: Left arm   Patient Position: Sitting   Cuff Size: Adult   Pulse: 73   Weight: 108 kg (238 lb)   Height: 6' 1" (1 854 m)       Physical Exam  Physical Exam  Constitutional:       Appearance: Normal appearance  He is well-developed  HENT:      Head: Normocephalic  Eyes:      Pupils: Pupils are equal, round, and reactive to light  Neck:      Musculoskeletal: Normal range of motion  Pulmonary:      Effort: Pulmonary effort is normal    Abdominal:      Palpations: Abdomen is soft  Genitourinary:     Prostate: Normal       Rectum: Normal       Comments: Prostate 45 g, smooth, nontender, no nodules  Musculoskeletal: Normal range of motion  Skin:     General: Skin is warm and dry  Neurological:      General: No focal deficit present  Mental Status: He is alert and oriented to person, place, and time  Psychiatric:         Mood and Affect: Mood normal          Behavior: Behavior normal          Thought Content: Thought content normal          Judgment: Judgment normal          Results    I have personally reviewed all pertinent lab results and reviewed with patient  Lab Results   Component Value Date    PSA 2 4 05/28/2019    PSA 4 4 (H) 10/31/2018     Lab Results   Component Value Date    K 4 4 10/27/2018    CO2 22 10/27/2018     10/27/2018    BUN 17 10/27/2018    CREATININE 0 85 10/27/2018     Lab Results   Component Value Date    WBC 8 4 10/31/2018    HGB 13 7 10/31/2018    HCT 41 0 10/31/2018    MCV 91 10/31/2018     10/31/2018     No results found for this or any previous visit (from the past 1 hour(s))

## 2021-01-07 NOTE — TELEPHONE ENCOUNTER
Referral     NPI [id number]   Diagnosis follow up   [facility] urology [place] Garcia   [phone]     States he has an appt today at 2:45pm

## 2021-01-14 ENCOUNTER — TELEPHONE (OUTPATIENT)
Dept: FAMILY MEDICINE CLINIC | Facility: CLINIC | Age: 51
End: 2021-01-14

## 2021-01-14 NOTE — TELEPHONE ENCOUNTER
Valid   Referral #: [id number]  Effective: 01/14/2021  Expires: 04/13/2021    Referral [id number] has been successfully submitted  [address] [address] [id number]      PATIENT'S INSURANCE  Member ID: [id number]     PRIMARY CARE PHYSICIAN  [facility]  NPI: [id number]  From  [address] [facility] [facility]  NPI: [id number]  [address] [facility]   [address]   [place], [address]   To  [facility]  NPI: [id number]  [facility], [address] [place] [id number], [place]   ,      Service Type:  Medical Care (Consult and Treat)   Place of Service:  Office      Note to Patient   This referral is valid at any location for the above group       Clinical Information     Diagnoses (1)   Diagnosis    1  R68 89 - Other general symptoms and signs       Procedures (1)   Procedure    1  [id number]

## 2021-01-18 DIAGNOSIS — M77.8 RIGHT ELBOW TENDONITIS: Primary | ICD-10-CM

## 2021-01-18 RX ORDER — DICLOFENAC SODIUM 30 MG/G
GEL TOPICAL
Qty: 100 G | Refills: 10 | Status: SHIPPED | OUTPATIENT
Start: 2021-01-18 | End: 2021-01-19 | Stop reason: SDUPTHER

## 2021-01-18 NOTE — TELEPHONE ENCOUNTER
Referral back date request   COF:[id number]  Dx:r68 89  FTL:[id number]    BD/Referral 11/06/20   Valid   Referral #: [id number]  Effective: 11/06/2020  Expires: 02/03/2021    Referral [id number] has been successfully submitted  BD/referral 11/10/20   Valid   Referral #: [id number]  Effective: 11/10/2020  Expires: 02/07/2021    Referral [id number] has been successfully submitted  BD/referral 11/16/20   Valid   Referral #: [id number]  Effective: 11/16/2020  Expires: 02/13/2021    Referral [id number] has been successfully submitted         All referral done thank you

## 2021-01-19 RX ORDER — DICLOFENAC SODIUM 30 MG/G
4 GEL TOPICAL 3 TIMES DAILY
Qty: 100 G | Refills: 10 | Status: SHIPPED | OUTPATIENT
Start: 2021-01-19 | End: 2021-04-19 | Stop reason: SDUPTHER

## 2021-01-23 LAB — PSA SERPL-MCNC: 2.4 NG/ML (ref 0–4)

## 2021-01-26 ENCOUNTER — TELEMEDICINE (OUTPATIENT)
Dept: FAMILY MEDICINE CLINIC | Facility: CLINIC | Age: 51
End: 2021-01-26
Payer: COMMERCIAL

## 2021-01-26 DIAGNOSIS — B34.9 VIRAL INFECTION, UNSPECIFIED: Primary | ICD-10-CM

## 2021-01-26 DIAGNOSIS — B34.9 VIRAL INFECTION, UNSPECIFIED: ICD-10-CM

## 2021-01-26 PROCEDURE — U0003 INFECTIOUS AGENT DETECTION BY NUCLEIC ACID (DNA OR RNA); SEVERE ACUTE RESPIRATORY SYNDROME CORONAVIRUS 2 (SARS-COV-2) (CORONAVIRUS DISEASE [COVID-19]), AMPLIFIED PROBE TECHNIQUE, MAKING USE OF HIGH THROUGHPUT TECHNOLOGIES AS DESCRIBED BY CMS-2020-01-R: HCPCS | Performed by: NURSE PRACTITIONER

## 2021-01-26 PROCEDURE — U0005 INFEC AGEN DETEC AMPLI PROBE: HCPCS | Performed by: NURSE PRACTITIONER

## 2021-01-26 PROCEDURE — 99441 PR PHYS/QHP TELEPHONE EVALUATION 5-10 MIN: CPT | Performed by: NURSE PRACTITIONER

## 2021-01-26 NOTE — PROGRESS NOTES
COVID-19 Virtual Visit     Assessment/Plan:    Problem List Items Addressed This Visit     None      Visit Diagnoses     Viral infection, unspecified    -  Primary    Relevant Orders    Novel Coronavirus (Covid-19),PCR [facility] - Collected at Mobile V[facility] or [facility]         Disposition:     I referred patient to one of our centralized sites for a COVID-19 swab  Covid swab as ordered  Office will call with results  Remain home and quarantine until results are in  Increase fluids/rest   Continue OTC cold/cough medication as directed to treat symptoms  Call if symptos worsen or with any questions or concerns  I have spent 15 minutes directly with the patient  Greater than 50% of this time was spent in counseling/coordination of care regarding: instructions for management and patient and family education  Encounter provider RUFINO Cisneros    Provider located at [address] [place] [id number]    Recent Visits  No visits were found meeting these conditions  Showing recent visits within past 7 days and meeting all other requirements     Today's Visits  Date Type Provider Dept   01/26/21 Telemedicine RUFINO [facility]   Showing today's visits and meeting all other requirements     Future Appointments  No visits were found meeting these conditions  Showing future appointments within next 150 days and meeting all other requirements        Patient agrees to participate in a virtual check in via telephone or video visit instead of presenting to the office to address urgent/immediate medical needs  Patient is aware this is a billable service  After connecting through Telephone, the patient was identified by name and date of birth  Aarti Lozano was informed that this was a telemedicine visit and that the exam was being conducted confidentially over secure lines  My office door was closed   No one else was in the room  Haley Holcomb acknowledged consent and understanding of privacy and security of the telemedicine visit  I informed the patient that I have reviewed his record in Epic and presented the opportunity for him to ask any questions regarding the visit today  The patient agreed to participate  It was my intent to perform this visit via video technology but the patient was not able to do a video connection so the visit was completed via audio telephone only  Subjective:   Haley Holcomb is a 48 y o  male who is concerned about COVID-19  Patient's symptoms include fever, chills, nasal congestion, cough, diarrhea and headache  Patient denies fatigue, malaise, rhinorrhea, sore throat, anosmia, loss of taste, shortness of breath, chest tightness, abdominal pain, nausea, vomiting and myalgias  Date of symptom onset: 1/23/2021    Exposure:   Contact with a person who is under investigation (PUI) for or who is positive for COVID-19 within the last 14 days?: No    Hospitalized recently for fever and/or lower respiratory symptoms?: No      Currently a healthcare worker that is involved in direct patient care?: No      Works in a special setting where the risk of COVID-19 transmission may be high? (this may include long-term care, correctional and prison facilities; homeless shelters; assisted-living facilities and group homes ): No      Resident in a special setting where the risk of COVID-19 transmission may be high? (this may include long-term care, correctional and prison facilities; homeless shelters; assisted-living facilities and group homes ): No      Patient works in a [facility] [employer] pen[facility] and may have had an exposure  C/o sinus pressure, nasal congestion, low grade fever x 4 days  Diarrhea past 2 days  Denies shortness of breath  He is taking Dayquil to treat cold symptoms      Lab Results   Component Value Date    SARSCOV2 Not Detected 04/15/2020     Past Medical History:   Diagnosis Date    H/O vasectomy     Inguinal hernia, left 02/19/2018    CLEMENTINE AIKEN MD     Past Surgical History:   Procedure Laterality Date    APPENDECTOMY      CHOLECYSTECTOMY      GASTRECTOMY      gastric bypass    [facility]   HAND SURGERY      KNEE ARTHROSCOPY      AR REPAIR ING HERNIA,5+Y/O,REDUCIBL Left 2/27/2018    Procedure: REPAIR HERNIA INGUINAL WITH MESH;  Surgeon: Carmela Bahena MD;  Location:  MAIN OR;  Service: General     Current Outpatient Medications   Medication Sig Dispense Refill    Biotin 10 MG TABS Take by mouth      calcium carbonate-vitamin D (OSCAL-D) 500 mg-200 units per tablet Take 1 tablet by mouth 2 (two) times a day with meals      cholecalciferol (VITAMIN D3) 400 units tablet Take 400 Units by mouth daily      Cyanocobalamin (VITAMIN B 12 PO) Take by mouth      Diclofenac Sodium 3 % GEL Apply 4 g topically 3 (three) times a day 100 g 10    finasteride (PROSCAR) 5 mg tablet Take 1 tablet (5 mg total) by mouth daily 90 tablet 3    Glucosamine-Chondroitin (GLUCOSAMINE CHONDR COMPLEX PO) Take by mouth      Multiple Vitamin (MULTIVITAMIN) capsule Take 1 capsule by mouth daily      sildenafil (VIAGRA) 50 MG tablet Take 2 tablets (100 mg total) by mouth as needed for erectile dysfunction 21 tablet 3    tamsulosin (FLOMAX) 0 4 mg Take 1 capsule (0 4 mg total) by mouth daily with dinner 90 capsule 3     No current facility-administered medications for this visit  No Known Allergies    Review of Systems   Constitutional: Positive for chills and fever  Negative for fatigue  HENT: Positive for congestion  Negative for rhinorrhea and sore throat  Respiratory: Positive for cough  Negative for chest tightness and shortness of breath  Gastrointestinal: Positive for diarrhea  Negative for abdominal pain, nausea and vomiting  Musculoskeletal: Negative for myalgias  Neurological: Positive for headaches  Objective:     There were no vitals filed for this visit  Physical Exam  Vitals reviewed: Physical assesment not done  No access for video call  PHQ-9 Depression Screening    PHQ-9:   Frequency of the following problems over the past two weeks:      Little interest or pleasure in doing things: 0 - not at all  Feeling down, depressed, or hopeless: 0 - not at all  PHQ-2 Score: 0         VIRTUAL VISIT DISCLAIMER    Sunil Nesbitt acknowledges that he has consented to an online visit or consultation  He understands that the online visit is based solely on information provided by him, and that, in the absence of a face-to-face physical evaluation by the physician, the diagnosis he receives is both limited and provisional in terms of accuracy and completeness  This is not intended to replace a full medical face-to-face evaluation by the physician  Sunil Nesbitt understands and accepts these terms

## 2021-01-27 ENCOUNTER — TELEPHONE (OUTPATIENT)
Dept: FAMILY MEDICINE CLINIC | Facility: CLINIC | Age: 51
End: 2021-01-27

## 2021-01-27 LAB — SARS-COV-2 RNA RESP QL NAA+PROBE: POSITIVE

## 2021-01-27 NOTE — TELEPHONE ENCOUNTER
I called patients home # to give him his COVID + test results -- wife answered and when I ask for him she stated that he is not home from work yet  I told her that he was NOT to have gone to work, and now he is Covid + (she is on his [employer] Consent Form)    I ask her to please have him call office as I need to review his follow up  I made both Sebastian Grant aware that he did NOT quarantine as he was directed to do  Patient called back, states he didn't know he had to stay home pending results  I reminded him that Malathi did tell him that  Advised that he needs to call his employer now and tell them that he tested Positive  Telemed appointment scheduled for 2/1/2021            ----- Message from [address] [place] [address],[address] sent at 1/27/2021 12:33 PM EST -----  Call pt  Positive Covid  Isolate 10 days from onset of symptoms and fever free 24 hours  Schedule f/up virtual in 5 days  Call with questions/concerns

## 2021-02-01 ENCOUNTER — TELEMEDICINE (OUTPATIENT)
Dept: FAMILY MEDICINE CLINIC | Facility: CLINIC | Age: 51
End: 2021-02-01
Payer: COMMERCIAL

## 2021-02-01 VITALS — BODY MASS INDEX: 31.54 KG/M2 | HEIGHT: 73 IN | WEIGHT: 238 LBS

## 2021-02-01 DIAGNOSIS — U07.1 COVID-19: Primary | ICD-10-CM

## 2021-02-01 PROCEDURE — 3008F BODY MASS INDEX DOCD: CPT | Performed by: NURSE PRACTITIONER

## 2021-02-01 PROCEDURE — 99442 PR PHYS/QHP TELEPHONE EVALUATION 11-20 MIN: CPT | Performed by: NURSE PRACTITIONER

## 2021-02-01 NOTE — PROGRESS NOTES
COVID-19 Virtual Visit     Assessment/Plan:    Problem List Items Addressed This Visit     None      Visit Diagnoses     COVID-19    -  Primary         Disposition:     I recommended continued isolation until at least 24 hours have passed since recovery defined as resolution of fever without the use of fever-reducing medications AND improvement in COVID symptoms AND 10 days have passed since onset of symptoms (or 10 days have passed since date of first positive viral diagnostic test for asymptomatic patients)  I have spent 15 minutes directly with the patient  Greater than 50% of this time was spent in counseling/coordination of care regarding: instructions for management, patient and family education and importance of treatment compliance  Encounter provider RUFINO Farah    Provider located at [address] [place] [address]    Recent Visits  Date Type Provider Dept   01/27/21 Telephone Jose Rivera [facility]   01/26/21 Telemedicine RUFINO Aldana Pg [facility]   Showing recent visits within past 7 days and meeting all other requirements     Today's Visits  Date Type Provider Dept   02/01/21 Telemedicine RUFINO Aldana Pg [facility]   Showing today's visits and meeting all other requirements     Future Appointments  No visits were found meeting these conditions  Showing future appointments within next 150 days and meeting all other requirements        Patient agrees to participate in a virtual check in via telephone or video visit instead of presenting to the office to address urgent/immediate medical needs  Patient is aware this is a billable service  After connecting through Telephone, the patient was identified by name and date of birth   Alba Manzano was informed that this was a telemedicine visit and that the exam was being conducted confidentially over secure lines  Helen Villalpando acknowledged consent and understanding of privacy and security of the telemedicine visit  I informed the patient that I have reviewed his record in Epic and presented the opportunity for him to ask any questions regarding the visit today  The patient agreed to participate  It was my intent to perform this visit via video technology but the patient was not able to do a video connection so the visit was completed via audio telephone only  Subjective:   Helen Villalpando is a 48 y o  male who has been screened for COVID-19  Symptom change since last report: improving  Patient's symptoms include fatigue, nasal congestion and diarrhea  Patient denies fever, chills, rhinorrhea, sore throat, anosmia, loss of taste, cough, shortness of breath, chest tightness, abdominal pain, nausea, vomiting, myalgias and headaches  Jaci Boxer has been staying home and has isolated themselves in his home  He is taking care to not share personal items and is cleaning all surfaces that are touched often, like counters, tabletops, and doorknobs using household cleaning sprays or wipes  He is wearing a mask when he leaves his room  Date of symptom onset: 1/23/2021  Date of positive COVID-19 PCR: 1/26/2021    Lab Results   Component Value Date    SARSCOV2 Positive (A) 01/26/2021    SARSCOV2 Not Detected 04/15/2020     Past Medical History:   Diagnosis Date   Laquita Splinter H/O vasectomy     Inguinal hernia, left 02/19/2018    CLEMENTINE AIKEN MD     Past Surgical History:   Procedure Laterality Date    APPENDECTOMY      CHOLECYSTECTOMY      GASTRECTOMY      gastric bypass    [facility]      HAND SURGERY      KNEE ARTHROSCOPY      SC REPAIR ING HERNIA,5+Y/O,REDUCIBL Left 2/27/2018    Procedure: REPAIR HERNIA INGUINAL WITH MESH;  Surgeon: Ute George MD;  Location: [facility] OR;  Service: General     Current Outpatient Medications   Medication Sig Dispense Refill    Biotin 10 MG TABS Take by mouth      calcium carbonate-vitamin D (OSCAL-D) 500 mg-200 units per tablet Take 1 tablet by mouth 2 (two) times a day with meals      cholecalciferol (VITAMIN D3) 400 units tablet Take 400 Units by mouth daily      Cyanocobalamin (VITAMIN B 12 PO) Take by mouth      Diclofenac Sodium 3 % GEL Apply 4 g topically 3 (three) times a day 100 g 10    finasteride (PROSCAR) 5 mg tablet Take 1 tablet (5 mg total) by mouth daily 90 tablet 3    Glucosamine-Chondroitin (GLUCOSAMINE CHONDR COMPLEX PO) Take by mouth      Multiple Vitamin (MULTIVITAMIN) capsule Take 1 capsule by mouth daily      sildenafil (VIAGRA) 50 MG tablet Take 2 tablets (100 mg total) by mouth as needed for erectile dysfunction 21 tablet 3    tamsulosin (FLOMAX) 0 4 mg Take 1 capsule (0 4 mg total) by mouth daily with dinner 90 capsule 3     No current facility-administered medications for this visit  No Known Allergies    Review of Systems   Constitutional: Positive for fatigue  Negative for chills and fever  HENT: Positive for congestion  Negative for rhinorrhea and sore throat  Respiratory: Negative for cough, chest tightness and shortness of breath  Gastrointestinal: Positive for diarrhea  Negative for abdominal pain, nausea and vomiting  Musculoskeletal: Negative for myalgias  Neurological: Negative for headaches  Objective:    Vitals:    02/01/21 0742   Weight: 108 kg (238 lb)   Height: 6' 1" (1 854 m)       Physical Exam  Vitals reviewed: called via landline-unable to do physical assessment  VIRTUAL VISIT DISCLAIMER    Roscoe Hidalgo acknowledges that he has consented to an online visit or consultation  He understands that the online visit is based solely on information provided by him, and that, in the absence of a face-to-face physical evaluation by the physician, the diagnosis he receives is both limited and provisional in terms of accuracy and completeness   This is not intended to replace a full medical face-to-face evaluation by the physician  Raffaele Franks understands and accepts these terms

## 2021-02-05 ENCOUNTER — TELEPHONE (OUTPATIENT)
Dept: UROLOGY | Facility: MEDICAL CENTER | Age: 51
End: 2021-02-05

## 2021-02-05 NOTE — TELEPHONE ENCOUNTER
Spoke with patient and explained his psa was 2 4 same as it was 2 years ago    He has follow up in about a year

## 2021-03-01 DIAGNOSIS — R21 RASH: Primary | ICD-10-CM

## 2021-03-01 RX ORDER — CLOTRIMAZOLE AND BETAMETHASONE DIPROPIONATE 10; .64 MG/G; MG/G
CREAM TOPICAL
Qty: 45 G | Refills: 1 | Status: SHIPPED | OUTPATIENT
Start: 2021-03-01 | End: 2021-04-19 | Stop reason: SDUPTHER

## 2021-04-13 DIAGNOSIS — Z98.84 STATUS POST GASTRIC BYPASS FOR OBESITY: ICD-10-CM

## 2021-04-13 DIAGNOSIS — D50.9 IRON DEFICIENCY ANEMIA, UNSPECIFIED IRON DEFICIENCY ANEMIA TYPE: ICD-10-CM

## 2021-04-13 DIAGNOSIS — Z78.9 PATIENT UNDERSTANDS IMPORTANCE OF MEDICATION ADHERENCE: Primary | ICD-10-CM

## 2021-04-13 DIAGNOSIS — E55.9 VITAMIN D DEFICIENCY: ICD-10-CM

## 2021-04-13 DIAGNOSIS — D51.8 OTHER VITAMIN B12 DEFICIENCY ANEMIA: ICD-10-CM

## 2021-04-13 NOTE — PROGRESS NOTES
Due yearly labs (had bypass)  CMP LIPID TSH B12 VIT D FERRITIN IRON CBC  DX: ANEMIA, S/P GASTRIC BYPASS

## 2021-04-17 LAB
25(OH)D3+25(OH)D2 SERPL-MCNC: 24.3 NG/ML (ref 30–100)
ALBUMIN SERPL-MCNC: 4.7 G/DL (ref 3.8–4.9)
ALBUMIN/GLOB SERPL: 2.4 {RATIO} (ref 1.2–2.2)
ALP SERPL-CCNC: 64 IU/L (ref 39–117)
ALT SERPL-CCNC: 18 IU/L (ref 0–44)
AST SERPL-CCNC: 19 IU/L (ref 0–40)
BASOPHILS # BLD AUTO: 0.1 X10E3/UL (ref 0–0.2)
BASOPHILS NFR BLD AUTO: 1 %
BILIRUB SERPL-MCNC: 0.6 MG/DL (ref 0–1.2)
BUN SERPL-MCNC: 13 MG/DL (ref 6–24)
BUN/CREAT SERPL: 12 (ref 9–20)
CALCIUM SERPL-MCNC: 9.7 MG/DL (ref 8.7–10.2)
CHLORIDE SERPL-SCNC: 103 MMOL/L (ref 96–106)
CHOLEST SERPL-MCNC: 217 MG/DL (ref 100–199)
CO2 SERPL-SCNC: 27 MMOL/L (ref 20–29)
CREAT SERPL-MCNC: 1.08 MG/DL (ref 0.76–1.27)
EOSINOPHIL # BLD AUTO: 0.2 X10E3/UL (ref 0–0.4)
EOSINOPHIL NFR BLD AUTO: 3 %
ERYTHROCYTE [DISTWIDTH] IN BLOOD BY AUTOMATED COUNT: 12.8 % (ref 11.6–15.4)
FERRITIN SERPL-MCNC: 171 NG/ML (ref 30–400)
GLOBULIN SER-MCNC: 2 G/DL (ref 1.5–4.5)
GLUCOSE SERPL-MCNC: 94 MG/DL (ref 65–99)
HCT VFR BLD AUTO: 44.1 % (ref 37.5–51)
HDLC SERPL-MCNC: 69 MG/DL
HGB BLD-MCNC: 14.7 G/DL (ref 13–17.7)
IMM GRANULOCYTES # BLD: 0 X10E3/UL (ref 0–0.1)
IMM GRANULOCYTES NFR BLD: 0 %
IRON SERPL-MCNC: 108 UG/DL (ref 38–169)
LDLC SERPL CALC-MCNC: 124 MG/DL (ref 0–99)
LDLC/HDLC SERPL: 1.8 RATIO (ref 0–3.6)
LYMPHOCYTES # BLD AUTO: 1.9 X10E3/UL (ref 0.7–3.1)
LYMPHOCYTES NFR BLD AUTO: 30 %
MCH RBC QN AUTO: 30.1 PG (ref 26.6–33)
MCHC RBC AUTO-ENTMCNC: 33.3 G/DL (ref 31.5–35.7)
MCV RBC AUTO: 90 FL (ref 79–97)
MONOCYTES # BLD AUTO: 0.5 X10E3/UL (ref 0.1–0.9)
MONOCYTES NFR BLD AUTO: 7 %
NEUTROPHILS # BLD AUTO: 3.6 X10E3/UL (ref 1.4–7)
NEUTROPHILS NFR BLD AUTO: 59 %
PLATELET # BLD AUTO: 294 X10E3/UL (ref 150–450)
POTASSIUM SERPL-SCNC: 4.6 MMOL/L (ref 3.5–5.2)
PROT SERPL-MCNC: 6.7 G/DL (ref 6–8.5)
RBC # BLD AUTO: 4.89 X10E6/UL (ref 4.14–5.8)
SL AMB EGFR AFRICAN AMERICAN: 91 ML/MIN/1.73
SL AMB EGFR NON AFRICAN AMERICAN: 79 ML/MIN/1.73
SL AMB VLDL CHOLESTEROL CALC: 24 MG/DL (ref 5–40)
SODIUM SERPL-SCNC: 142 MMOL/L (ref 134–144)
TRIGL SERPL-MCNC: 139 MG/DL (ref 0–149)
TSH SERPL DL<=0.005 MIU/L-ACNC: 3.36 UIU/ML (ref 0.45–4.5)
VIT B12 SERPL-MCNC: >2000 PG/ML (ref 232–1245)
WBC # BLD AUTO: 6.2 X10E3/UL (ref 3.4–10.8)

## 2021-04-19 ENCOUNTER — OFFICE VISIT (OUTPATIENT)
Dept: FAMILY MEDICINE CLINIC | Facility: CLINIC | Age: 51
End: 2021-04-19
Payer: COMMERCIAL

## 2021-04-19 VITALS
DIASTOLIC BLOOD PRESSURE: 76 MMHG | HEART RATE: 74 BPM | HEIGHT: 73 IN | OXYGEN SATURATION: 96 % | BODY MASS INDEX: 30.75 KG/M2 | SYSTOLIC BLOOD PRESSURE: 124 MMHG | WEIGHT: 232 LBS

## 2021-04-19 DIAGNOSIS — N40.1 BENIGN PROSTATIC HYPERPLASIA WITH INCOMPLETE BLADDER EMPTYING: Primary | ICD-10-CM

## 2021-04-19 DIAGNOSIS — Z12.12 SCREENING FOR MALIGNANT NEOPLASM OF THE RECTUM: ICD-10-CM

## 2021-04-19 DIAGNOSIS — Z00.00 WELLNESS EXAMINATION: ICD-10-CM

## 2021-04-19 DIAGNOSIS — R39.14 BENIGN PROSTATIC HYPERPLASIA WITH INCOMPLETE BLADDER EMPTYING: Primary | ICD-10-CM

## 2021-04-19 DIAGNOSIS — Z12.11 SPECIAL SCREENING FOR MALIGNANT NEOPLASMS, COLON: Primary | ICD-10-CM

## 2021-04-19 DIAGNOSIS — R21 RASH: ICD-10-CM

## 2021-04-19 DIAGNOSIS — F51.01 PRIMARY INSOMNIA: ICD-10-CM

## 2021-04-19 DIAGNOSIS — M77.8 RIGHT ELBOW TENDONITIS: ICD-10-CM

## 2021-04-19 PROCEDURE — 99213 OFFICE O/P EST LOW 20 MIN: CPT | Performed by: FAMILY MEDICINE

## 2021-04-19 PROCEDURE — 99396 PREV VISIT EST AGE 40-64: CPT | Performed by: FAMILY MEDICINE

## 2021-04-19 PROCEDURE — 3008F BODY MASS INDEX DOCD: CPT | Performed by: FAMILY MEDICINE

## 2021-04-19 RX ORDER — DICLOFENAC SODIUM 30 MG/G
4 GEL TOPICAL 2 TIMES DAILY
Qty: 100 G | Refills: 10 | Status: SHIPPED | OUTPATIENT
Start: 2021-04-19 | End: 2022-07-13

## 2021-04-19 RX ORDER — CYCLOBENZAPRINE HCL 10 MG
10 TABLET ORAL 3 TIMES DAILY PRN
Qty: 90 TABLET | Refills: 3 | Status: SHIPPED | OUTPATIENT
Start: 2021-04-19 | End: 2022-08-02 | Stop reason: SDUPTHER

## 2021-04-19 RX ORDER — CLOTRIMAZOLE AND BETAMETHASONE DIPROPIONATE 10; .64 MG/G; MG/G
1 CREAM TOPICAL 2 TIMES DAILY
Qty: 135 G | Refills: 1 | Status: SHIPPED | OUTPATIENT
Start: 2021-04-19

## 2021-04-19 NOTE — PROGRESS NOTES
HPI:  Jourdan Riggins is a 46 y o  male here for his yearly health maintenance exam    Patient Active Problem List   Diagnosis    S/P inguinal hernia repair    Vitamin D deficiency    Vitamin B12 deficiency    Benign prostatic hyperplasia with lower urinary tract symptoms    Erectile disorder due to medical condition in male    Elevated PSA     Past Medical History:   Diagnosis Date    H/O vasectomy     Inguinal hernia, left 02/19/2018    CLEMENTINE AIKEN MD       1  Advanced Directive: n     2  Durable Power of  for Healthcare: n     3  Social History:           Drug and alcohol History: n                  4  Immunizations up to date: y                 Lifestyle:                           Healthy Diet:y                          Alcohol Use:y                          Tobacco Use:n                          Regular exercise:y                          Weight concerns:n                               5   Over the past 2 weeks, how often have you been bothered by the following:              Little interest or pleasure in doing things:n              Felling down, depressed or hopeless:n       Current Outpatient Medications   Medication Sig Dispense Refill    Biotin 10 MG TABS Take by mouth      calcium carbonate-vitamin D (OSCAL-D) 500 mg-200 units per tablet Take 1 tablet by mouth 2 (two) times a day with meals      cholecalciferol (VITAMIN D3) 400 units tablet Take 400 Units by mouth daily      clotrimazole-betamethasone (LOTRISONE) 1-0 05 % cream Apply 1 application topically 2 (two) times a day To affected area 135 g 1    Cyanocobalamin (VITAMIN B 12 PO) Take by mouth      Diclofenac Sodium 3 % GEL Apply 4 g topically 2 (two) times a day 100 g 10    finasteride (PROSCAR) 5 mg tablet Take 1 tablet (5 mg total) by mouth daily 90 tablet 3    Glucosamine-Chondroitin (GLUCOSAMINE CHONDR COMPLEX PO) Take by mouth      Multiple Vitamin (MULTIVITAMIN) capsule Take 1 capsule by mouth daily      sildenafil (VIAGRA) 50 MG tablet Take 2 tablets (100 mg total) by mouth as needed for erectile dysfunction 21 tablet 3    tamsulosin (FLOMAX) 0 4 mg Take 1 capsule (0 4 mg total) by mouth daily with dinner 90 capsule 3    cyclobenzaprine (FLEXERIL) 10 mg tablet Take 1 tablet (10 mg total) by mouth 3 (three) times a day as needed for muscle spasms 90 tablet 3     No current facility-administered medications for this visit  No Known Allergies  Immunization History   Administered Date(s) Administered    Hep B, adult 10/30/1998, 11/27/1998, 06/17/1999    INFLUENZA 12/19/2005, 11/19/2007    Tdap 08/19/2016    Tuberculin Skin Test-PPD Intradermal 11/10/1998, 06/07/1999       Patient Care Team:  Jennifer Leon MD as PCP - General    Review of Systems   Constitutional: Negative for fatigue, fever and unexpected weight change  HENT: Negative for congestion, sinus pressure and sore throat  Eyes: Negative for visual disturbance  Respiratory: Negative for shortness of breath and wheezing  Cardiovascular: Negative for chest pain and palpitations  Gastrointestinal: Negative for abdominal pain, diarrhea, nausea and vomiting  Physical Exam :  Physical Exam  Constitutional:       General: He is not in acute distress  Appearance: He is well-developed  He is not diaphoretic  HENT:      Right Ear: Tympanic membrane, ear canal and external ear normal  Tympanic membrane is not injected  Left Ear: Tympanic membrane, ear canal and external ear normal  Tympanic membrane is not injected  Nose: Nose normal       Mouth/Throat:      Pharynx: Uvula midline  Eyes:      Conjunctiva/sclera: Conjunctivae normal       Pupils: Pupils are equal, round, and reactive to light  Neck:      Musculoskeletal: Normal range of motion and neck supple  Thyroid: No thyromegaly  Cardiovascular:      Rate and Rhythm: Normal rate and regular rhythm  Heart sounds: Normal heart sounds  No murmur     Pulmonary:      Effort: Pulmonary effort is normal  No respiratory distress  Breath sounds: Normal breath sounds  No wheezing  Lymphadenopathy:      Cervical: No cervical adenopathy  Assessment and Plan:  1  Benign prostatic hyperplasia with incomplete bladder emptying     2  Primary insomnia  cyclobenzaprine (FLEXERIL) 10 mg tablet   3  Rash  clotrimazole-betamethasone (LOTRISONE) 1-0 05 % cream   4  Right elbow tendonitis  Diclofenac Sodium 3 % GEL   5   Wellness examination         Health Maintenance Due   Topic Date Due    HIV Screening  Never done    COVID-19 Vaccine (1) Never done    Annual Physical  10/30/2019    Colorectal Cancer Screening  Never done    Influenza Vaccine (1) 09/01/2020    BMI: Followup Plan  02/24/2021

## 2021-04-19 NOTE — PROGRESS NOTES
[facility]        NAME: Cammie Miller is a 46 y o  male  : 1970    MRN: [id number]  DATE: 2021  TIME: 3:49 PM    Assessment and Plan   Benign prostatic hyperplasia with incomplete bladder emptying [N40 1, R39 14]  1  Benign prostatic hyperplasia with incomplete bladder emptying     2  Primary insomnia  cyclobenzaprine (FLEXERIL) 10 mg tablet   3  Rash  clotrimazole-betamethasone (LOTRISONE) 1-0 05 % cream   4  Right elbow tendonitis  Diclofenac Sodium 3 % GEL   5  Wellness examination           Patient Instructions     Patient Instructions   Same meds          Chief Complaint     Chief Complaint   Patient presents with    Follow-up     MM/labs--Sleep concerns    Headache     x2 weeks    Annual Exam     HM         History of Present Illness       F/u assessed med cond--stable    Headache   Pertinent negatives include no abdominal pain, fever, nausea, numbness, sore throat, vomiting or weakness  Review of Systems   Review of Systems   Constitutional: Negative for fatigue, fever and unexpected weight change  HENT: Negative for congestion, sinus pain and sore throat  Eyes: Negative for visual disturbance  Respiratory: Negative for shortness of breath and wheezing  Cardiovascular: Negative for chest pain and palpitations  Gastrointestinal: Negative for abdominal pain, nausea and vomiting  Musculoskeletal: Negative  Negative for arthralgias and myalgias  Neurological: Positive for headaches  Negative for syncope, weakness and numbness  Psychiatric/Behavioral: Negative  Negative for confusion, dysphoric mood and suicidal ideas           Current Medications       Current Outpatient Medications:     Biotin 10 MG TABS, Take by mouth, Disp: , Rfl:     calcium carbonate-vitamin D (OSCAL-D) 500 mg-200 units per tablet, Take 1 tablet by mouth 2 (two) times a day with meals, Disp: , Rfl:     cholecalciferol (VITAMIN D3) 400 units tablet, Take 400 Units by mouth daily, Disp: , Rfl:     clotrimazole-betamethasone (LOTRISONE) 1-0 05 % cream, Apply 1 application topically 2 (two) times a day To affected area, Disp: 135 g, Rfl: 1    Cyanocobalamin (VITAMIN B 12 PO), Take by mouth, Disp: , Rfl:     Diclofenac Sodium 3 % GEL, Apply 4 g topically 2 (two) times a day, Disp: 100 g, Rfl: 10    finasteride (PROSCAR) 5 mg tablet, Take 1 tablet (5 mg total) by mouth daily, Disp: 90 tablet, Rfl: 3    Glucosamine-Chondroitin (GLUCOSAMINE CHONDR COMPLEX PO), Take by mouth, Disp: , Rfl:     Multiple Vitamin (MULTIVITAMIN) capsule, Take 1 capsule by mouth daily, Disp: , Rfl:     sildenafil (VIAGRA) 50 MG tablet, Take 2 tablets (100 mg total) by mouth as needed for erectile dysfunction, Disp: 21 tablet, Rfl: 3    tamsulosin (FLOMAX) 0 4 mg, Take 1 capsule (0 4 mg total) by mouth daily with dinner, Disp: 90 capsule, Rfl: 3    cyclobenzaprine (FLEXERIL) 10 mg tablet, Take 1 tablet (10 mg total) by mouth 3 (three) times a day as needed for muscle spasms, Disp: 90 tablet, Rfl: 3    Current Allergies     Allergies as of 04/19/2021    (No Known Allergies)            The following portions of the patient's history were reviewed and updated as appropriate: allergies, current medications, past family history, past medical history, past social history, past surgical history and problem list      Past Medical History:   Diagnosis Date   Miya Maguire H/O vasectomy     Inguinal hernia, left 02/19/2018    CLEMENTINE AIKEN MD       Past Surgical History:   Procedure Laterality Date    APPENDECTOMY      CHOLECYSTECTOMY      GASTRECTOMY      gastric bypass    [facility]PASS  2012    [facility]      HAND SURGERY      KNEE ARTHROSCOPY      PA REPAIR ING HERNIA,5+Y/O,REDUCIBL Left 2/27/2018    Procedure: REPAIR HERNIA INGUINAL WITH MESH;  Surgeon: Katia Wood MD;  Location:  MAIN OR;  Service: General       Family History   Problem Relation Age of Onset    Cancer Mother    Miya Maguire Hypertension Mother     Thyroid disease Mother     Hypothyroidism Mother     Diabetes Father         TYPE 2, CONTROLLED    Hypertension Father     Prostate cancer Father     COPD Sister     Mental illness Neg Hx     Substance Abuse Neg Hx          Medications have been verified  Objective   /76   Pulse 74   Ht 6' 1" (1 854 m)   Wt 105 kg (232 lb)   SpO2 96%   BMI 30 61 kg/m²        Physical Exam     Physical Exam  Constitutional:       Appearance: He is well-developed  HENT:      Right Ear: Ear canal normal  Tympanic membrane is not injected  Left Ear: Ear canal normal  Tympanic membrane is not injected  Nose: Nose normal    Eyes:      General:         Right eye: No discharge  Left eye: No discharge  Conjunctiva/sclera: Conjunctivae normal       Pupils: Pupils are equal, round, and reactive to light  Neck:      Musculoskeletal: Normal range of motion and neck supple  Thyroid: No thyromegaly  Cardiovascular:      Rate and Rhythm: Normal rate and regular rhythm  Heart sounds: Normal heart sounds  No murmur  Pulmonary:      Effort: Pulmonary effort is normal  No respiratory distress  Breath sounds: Normal breath sounds  No wheezing  Abdominal:      General: Bowel sounds are normal  There is no distension  Palpations: Abdomen is soft  Tenderness: There is no abdominal tenderness  Musculoskeletal: Normal range of motion  Lymphadenopathy:      Cervical: No cervical adenopathy  Skin:     General: Skin is warm and dry  Neurological:      Mental Status: He is alert and oriented to person, place, and time  He is not disoriented  Sensory: No sensory deficit  Gait: Gait normal       Deep Tendon Reflexes: Reflexes are normal and symmetric  Psychiatric:         Speech: Speech normal          Behavior: Behavior normal          Thought Content:  Thought content normal          Judgment: Judgment normal

## 2021-12-20 ENCOUNTER — TELEPHONE (OUTPATIENT)
Dept: FAMILY MEDICINE CLINIC | Facility: CLINIC | Age: 51
End: 2021-12-20

## 2021-12-20 DIAGNOSIS — N40.1 BENIGN PROSTATIC HYPERPLASIA WITH INCOMPLETE BLADDER EMPTYING: ICD-10-CM

## 2021-12-20 DIAGNOSIS — R39.14 BENIGN PROSTATIC HYPERPLASIA WITH INCOMPLETE BLADDER EMPTYING: ICD-10-CM

## 2021-12-20 RX ORDER — TAMSULOSIN HYDROCHLORIDE 0.4 MG/1
CAPSULE ORAL
Qty: 90 CAPSULE | Refills: 3 | Status: SHIPPED | OUTPATIENT
Start: 2021-12-20

## 2021-12-20 RX ORDER — FINASTERIDE 5 MG/1
TABLET, FILM COATED ORAL
Qty: 90 TABLET | Refills: 3 | Status: SHIPPED | OUTPATIENT
Start: 2021-12-20

## 2022-01-05 ENCOUNTER — TELEPHONE (OUTPATIENT)
Dept: UROLOGY | Facility: AMBULATORY SURGERY CENTER | Age: 52
End: 2022-01-05

## 2022-01-05 NOTE — TELEPHONE ENCOUNTER
Hello,    Patient is going to be seen at [address] [place] [address] Urology (NPI: [id number]) on 1/13, and is in need of an insurance referral   The diagnosis codes needed for the appointment are N40 1, R39 14, Z80 42, Z12 5  The procedure code needed for the visit will be [id number]  Thank you for your time and help

## 2022-01-05 NOTE — TELEPHONE ENCOUNTER
Confirmation Tona Jarvis [id number]  Effective: 01/05/2022     Expires: 04/05/2022  Referred From  [facility]  Group TYZ: [id number]  Provider AS: [id number]  Tax AY: [id number]  [address] [facility] [address] [facility] [address]  [place] [address]  Referred To  [address]  Specialty: Not Available  Tier 2  Group XSN: [id number]  Provider FV: [id number]  Tax NU: [id number]  This referral is valid at any location for the above group    Patient Info  Murray Doherty  [id number]  Male  1970  [address]  [address]  [place] [address]  Clinical Information  Place of Service  Office  Service Type  Medical Care  Diagnoses  1 N40 1 - benign prostatic hyperplasia with lower urinary tract symptoms  2 R39 14 - feeling of incomplete bladder emptying  6 S96 99 - family history of malignant neoplasm of prostate  4 Z12 5 - encounter for screening for malignant neoplasm of prostate  Procedures  7 44177 - unlisted evaluation and management service        Disclaimer  [facility] and its Affiliates ([facility]) will pay for only those services covered under the [facility] Contract which are specifically noted and requested by the PCP or OBGYN on the referral  If any additional services, testing, or follow-up care are required, the PCP or OBGYN must be contacted prior to the delivery of such additional services for written approval on a separate referral  Non-referred services will not be covered by [facility]  Benefits are underwritten or administered by [employer], a subsidiary of [employer], which are independent licensees of the [employer] and [employer]

## 2022-01-13 ENCOUNTER — TELEPHONE (OUTPATIENT)
Dept: UROLOGY | Facility: HOSPITAL | Age: 52
End: 2022-01-13

## 2022-01-13 DIAGNOSIS — Z80.42 FAMILY HISTORY OF PROSTATE CANCER IN FATHER: Primary | ICD-10-CM

## 2022-01-13 NOTE — TELEPHONE ENCOUNTER
Cancelled and rescheduled appt due to Pt not having blood work done  Pt wife agreed and set up new time  Updated PSA ordered added  Pt will have PSA done while waiting for appt

## 2022-01-16 LAB — PSA SERPL-MCNC: 2.5 NG/ML (ref 0–4)

## 2022-02-06 ENCOUNTER — TELEPHONE (OUTPATIENT)
Dept: OTHER | Facility: OTHER | Age: 52
End: 2022-02-06

## 2022-02-06 NOTE — TELEPHONE ENCOUNTER
Patients wife Michelle Jaquez would like to know if the office received the referral for patients office visit on 2/7/2022 @ [phone]

## 2022-02-07 ENCOUNTER — OFFICE VISIT (OUTPATIENT)
Dept: UROLOGY | Facility: HOSPITAL | Age: 52
End: 2022-02-07
Payer: COMMERCIAL

## 2022-02-07 VITALS
OXYGEN SATURATION: 96 % | BODY MASS INDEX: 31.54 KG/M2 | HEART RATE: 76 BPM | WEIGHT: 238 LBS | DIASTOLIC BLOOD PRESSURE: 80 MMHG | SYSTOLIC BLOOD PRESSURE: 122 MMHG | HEIGHT: 73 IN

## 2022-02-07 DIAGNOSIS — N40.1 BENIGN PROSTATIC HYPERPLASIA WITH INCOMPLETE BLADDER EMPTYING: Primary | ICD-10-CM

## 2022-02-07 DIAGNOSIS — N52.9 ERECTILE DYSFUNCTION, UNSPECIFIED ERECTILE DYSFUNCTION TYPE: ICD-10-CM

## 2022-02-07 DIAGNOSIS — R39.14 BENIGN PROSTATIC HYPERPLASIA WITH INCOMPLETE BLADDER EMPTYING: Primary | ICD-10-CM

## 2022-02-07 PROCEDURE — 3008F BODY MASS INDEX DOCD: CPT | Performed by: NURSE PRACTITIONER

## 2022-02-07 PROCEDURE — 1036F TOBACCO NON-USER: CPT | Performed by: NURSE PRACTITIONER

## 2022-02-07 PROCEDURE — 99214 OFFICE O/P EST MOD 30 MIN: CPT | Performed by: NURSE PRACTITIONER

## 2022-02-07 NOTE — PROGRESS NOTES
02/07/22    David Fischer   1970   [id number]     Assessment  1 BPH with lower urinary tract symptoms  2 History of elevated PSA s/p negative prostate biopsy (9/2019)  3 Erectile dysfunction    Discussion/Plan  1 BPH with lower urinary tract symptoms   Continue Tamsulosin 0 4 mg daily   Continue Finasteride 5 mg daily   AUA 23   Hydration with water, avoid bladder irritants, avoid constipation   Renal US with PVR ordered   Cystoscopy recommended   2 History of elevated PSA s/p negative prostate biopsy (9/2019)   1/15/22 PSA 2 5 (5 0 corrected on Finasteride)  3 Erectile dysfunction   100 mg Sildenafil PRN      We reviewed surgical options for BPH including Urolift and TURP  Brochures provided  Patient will schedule imaging and cystoscopy  All questions answered at length  Subjective  HPI   David Fischer is a 46 y o  male with a history of BPH, ED, and elevated PSA presents today for follow up  He is accompanied by his wife  Patient remains on tamsulosin 0 4 mg HS and finasteride 5 mg daily  He experiences retrograde ejaculation with use of his medications  He reports episodes of incomplete bladder emptying if he forgets to take his medication  He continues to report persistent BPH symptoms despite medications  Patient denies any gross hematuria or dysuria  He is status post negative prostate biopsy performed in September 2019 for PSA of 2 4 (4 8 corrected with finasteride)  His father has a history of prostate cancer  He is managed on 100 mg Sildenafil for ED  No changes to overall health  No family history of kidney cancer  He works as a   He consumes primarily water, occasional ETOH  Occasional constipation        Component      Latest Ref Rng & Units 10/31/2018 5/28/2019 1/22/2021 1/15/2022           2:51 PM 10:10 AM 11:09 AM 10:43 AM   PSA, Total      0 0 - 4 0 ng/mL 4 4 (H) 2 4 2 4 2 5     Review of Systems - History obtained from chart review and the patient  General ROS: negative  Psychological ROS: negative  Hematological and Lymphatic ROS: negative  Endocrine ROS: negative  Breast ROS: negative for breast lumps  Respiratory ROS: no cough, shortness of breath, or wheezing  Cardiovascular ROS: negative  Gastrointestinal ROS: negative  Genito-Urinary ROS: positive for - erectile dysfunction  Musculoskeletal ROS: negative  Neurological ROS: negative  Dermatological ROS: negative     Objective  Physical Exam  Vitals and nursing note reviewed  Constitutional:       General: He is awake  He is not in acute distress  Appearance: Normal appearance  He is well-developed, well-groomed and normal weight  He is not ill-appearing, toxic-appearing or diaphoretic  Pulmonary:      Effort: Pulmonary effort is normal    Abdominal:      Tenderness: There is no right CVA tenderness or left CVA tenderness  Musculoskeletal:         General: Normal range of motion  Cervical back: Normal range of motion and neck supple  Skin:     General: Skin is warm  Neurological:      General: No focal deficit present  Mental Status: He is alert and oriented to person, place, and time  Mental status is at baseline  Psychiatric:         Attention and Perception: Attention normal          Mood and Affect: Mood normal          Speech: Speech normal          Behavior: Behavior normal  Behavior is cooperative  Thought Content: Thought content normal          Cognition and Memory: Cognition normal          Judgment: Judgment normal        Final Diagnosis 2019   A  Prostate, RLB, core needle biopsy:             - Benign prostate glands  - No malignancy is identified      B  Prostate, RLM, core needle biopsy:             - Benign prostate glands  - No malignancy is identified      C  Prostate, RLA, core needle biopsy:             - Benign prostate glands  - No malignancy is identified      D   Prostate, RMB, core needle biopsy:             - Benign prostate glands  - No malignancy is identified      E  Prostate, RMM, core needle biopsy:             - Benign prostate glands  - No malignancy is identified      F  Prostate, RMA, core needle biopsy:             - Benign prostate glands  - The diagnosis is supported by positive staining for basal cell markers (p63 and ) and no staining for p504s, (prostate triple stain, performed with an appropriate control)  - No malignancy is identified      G  Prostate, LLB, core needle biopsy:             - Benign prostate glands  - No malignancy is identified      H  Prostate, LLM, core needle biopsy:             - Benign prostate glands  - The diagnosis is supported by positive staining for basal cell markers (p63 and ), and no staining for p504s, (prostate triple stain, performed with an appropriate control)  - No malignancy is identified      I  Prostate, LLA, core needle biopsy:             - Benign prostate glands  - No malignancy is identified       J  Prostate, LMB, core needle biopsy:             - Benign prostate glands  - No malignancy is identified       K  Prostate, LMM, core needle biopsy:             - Benign prostate glands  - No malignancy is identified       L  Prostate, LMA, core needle biopsy:             - Benign prostate glands  - The diagnosis is supported by positive staining for basal cell markers (p63 and ), and no staining for p504s, (prostate triple stain, performed with an appropriate control)  - No malignancy is identified  AUA SYMPTOM SCORE      Most Recent Value   AUA SYMPTOM SCORE    How often have you had a sensation of not emptying your bladder completely after you finished urinating? 3   How often have you had to urinate again less than two hours after you finished urinating?  5   How often have you found you stopped and started again several times when you urinate? 5   How often have you found it difficult to postpone urination? 0   How often have you had a weak urinary stream? 4   How often have you had to push or strain to begin urination? 4   How many times did you most typically get up to urinate from the time you went to bed at night until the time you got up in the morning? 2   Quality of Life: If you were to spend the rest of your life with your urinary condition just the way it is now, how would you feel about that? 4   AUA SYMPTOM SCORE 23            RUFINO Byrne     I have spent 25 minutes with Patient  today in which greater than 50% of this time was spent in counseling/coordination of care regarding Risks and benefits of tx options, Intructions for management, Patient and family education, Importance of tx compliance, Risk factor reductions and Impressions

## 2022-02-08 ENCOUNTER — HOSPITAL ENCOUNTER (OUTPATIENT)
Dept: ULTRASOUND IMAGING | Facility: HOSPITAL | Age: 52
Discharge: HOME | End: 2022-02-08
Payer: COMMERCIAL

## 2022-02-08 DIAGNOSIS — R39.14 BENIGN PROSTATIC HYPERPLASIA WITH INCOMPLETE BLADDER EMPTYING: ICD-10-CM

## 2022-02-08 DIAGNOSIS — N40.1 BENIGN PROSTATIC HYPERPLASIA WITH INCOMPLETE BLADDER EMPTYING: ICD-10-CM

## 2022-02-08 PROCEDURE — 76770 US EXAM ABDO BACK WALL COMP: CPT

## 2022-02-21 ENCOUNTER — TELEMEDICINE (OUTPATIENT)
Dept: UROLOGY | Facility: HOSPITAL | Age: 52
End: 2022-02-21
Payer: COMMERCIAL

## 2022-02-21 DIAGNOSIS — N40.1 BENIGN PROSTATIC HYPERPLASIA WITH INCOMPLETE BLADDER EMPTYING: Primary | ICD-10-CM

## 2022-02-21 DIAGNOSIS — R39.14 BENIGN PROSTATIC HYPERPLASIA WITH INCOMPLETE BLADDER EMPTYING: Primary | ICD-10-CM

## 2022-02-21 PROCEDURE — 99442 PR PHYS/QHP TELEPHONE EVALUATION 11-20 MIN: CPT | Performed by: NURSE PRACTITIONER

## 2022-02-21 NOTE — PROGRESS NOTES
Virtual Brief Visit    Patient is located in the following state in which I hold an active license [place]    Assessment  1 BPH with lower urinary tract symptoms  2 History of elevated PSA s/p negative prostate biopsy (9/2019)  3 Erectile dysfunction     Discussion/Plan  1 BPH with lower urinary tract symptoms              Continue Tamsulosin 0 4 mg daily              Continue Finasteride 5 mg daily              AUA 23              Hydration with water, avoid bladder irritants, avoid constipation              2/8/22 Renal US with PVR: Prostatomegaly protruding into the bladder base, measuring 6 2 x 4 8 x 4 9 cm  Small left renal cyst No hydronephrosis  Ureteral jets are not well visualized  Moderate postvoid residual  Prostatomegaly  Cystoscopy scheduled   2 History of elevated PSA s/p negative prostate biopsy (9/2019)              1/15/22 PSA 2 5 (5 0 corrected on Finasteride)  3 Erectile dysfunction              100 mg Sildenafil PRN      We reviewed his imaging at length and surgical options for BPH  Patient will proceed with cystoscopy on 4/1/22 with Dr Brent Kaufman  All questions answered at length  HPI  Reuben Pacheco is a 46 y  o  male with a history of BPH, ED, and elevated PSA presents today for follow up to review imaging  Renal US shows elevated PVR and small left renal cyst  He is scheduled for cystoscopy to further discuss his surgical options moving forward  Patient remains on tamsulosin 0 4 mg HS and finasteride 5 mg daily  He experiences retrograde ejaculation with use of his medications  He reports episodes of incomplete bladder emptying if he forgets to take his medication  He continues to report persistent BPH symptoms despite medications  Patient denies any gross hematuria or dysuria   He is status post negative prostate biopsy performed in September 2019 for PSA of 2 4 (4 8 corrected with finasteride)  His father has a history of prostate cancer  He is managed on 100 mg Sildenafil for ED    No changes to overall health  No family history of kidney cancer  He works as a   He consumes primarily water, occasional ETOH  Occasional constipation  RENAL ULTRASOUND     INDICATION:   N40 1: Benign prostatic hyperplasia with lower urinary tract symptoms  R39 14: Feeling of incomplete bladder emptying      COMPARISON: None     TECHNIQUE:   Ultrasound of the retroperitoneum was performed with a curvilinear transducer utilizing volumetric sweeps and still imaging techniques       FINDINGS:     KIDNEYS:  Symmetric and normal size  Right kidney:  13 4 x 8 x 6 1 cm  Left kidney:  12 5 x 4 6 x 6 cm      Right kidney  Normal echogenicity and contour  No suspicious masses detected  No hydronephrosis  No shadowing calculi  No perinephric fluid collections      Left kidney  Normal echogenicity and contour  No suspicious masses detected  Cortical cyst measures 1 3 x 1 x 1 3 cm  No hydronephrosis  No shadowing calculi  No perinephric fluid collections      URETERS:  Nonvisualized      BLADDER:   Normally distended  No focal thickening or mass lesions  Bilateral ureteral jets are not detected  Prevoid bladder volume 466 cc  Postvoid bladder volume 179 cc's      Prostatomegaly protruding into the bladder base, measuring 6 2 x 4 8 x 4 9 cm      IMPRESSION:  1  Small left renal cyst   2  No hydronephrosis  Ureteral jets are not well visualized  3  Moderate postvoid residual   4  Prostatomegaly  Recent Visits  No visits were found meeting these conditions  Showing recent visits within past 7 days and meeting all other requirements  Today's Visits  Date Type Provider Dept   02/21/22 Samantha Russell, [facility]   Showing today's visits and meeting all other requirements  Future Appointments  No visits were found meeting these conditions    Showing future appointments within next 150 days and meeting all other requirements         I spent 15 minutes with patient today in which greater than 50% of the time was spent in counseling/coordination of care regarding BPH

## 2022-03-02 ENCOUNTER — TELEPHONE (OUTPATIENT)
Dept: FAMILY MEDICINE CLINIC | Facility: CLINIC | Age: 52
End: 2022-03-02

## 2022-03-03 NOTE — TELEPHONE ENCOUNTER
Results in chart / voicemail left for patient to call office  Cologuard positive ref [facility] [phone]

## 2022-03-07 ENCOUNTER — TELEPHONE (OUTPATIENT)
Dept: FAMILY MEDICINE CLINIC | Facility: CLINIC | Age: 52
End: 2022-03-07

## 2022-03-08 ENCOUNTER — TELEPHONE (OUTPATIENT)
Dept: GASTROENTEROLOGY | Facility: CLINIC | Age: 52
End: 2022-03-08

## 2022-03-29 ENCOUNTER — TELEPHONE (OUTPATIENT)
Dept: UROLOGY | Facility: CLINIC | Age: 52
End: 2022-03-29

## 2022-03-29 NOTE — TELEPHONE ENCOUNTER
Message left on voicemail line  Patients  need referral Dx code for cysto procedure on 04/01/22   PLease call back with information

## 2022-03-30 NOTE — TELEPHONE ENCOUNTER
Spoke with patients wife and gave her the codes below    She wanted to make sure the visit was covered under the previous referral  She will call her family dr to see if she need another referral          N40 1 (ICD-10-CM) - Benign prostatic hyperplasia with lower urinary tract symptoms  R39 14 (ICD-10-CM) - Feeling of incomplete bladder emptying  Z80 42 (ICD-10-CM) - Family history of malignant neoplasm of prostate  Z12 5 (ICD-10-CM) - Encounter for screening for malignant neoplasm of prostate

## 2022-04-01 ENCOUNTER — PROCEDURE VISIT (OUTPATIENT)
Dept: UROLOGY | Facility: HOSPITAL | Age: 52
End: 2022-04-01
Payer: COMMERCIAL

## 2022-04-01 VITALS
WEIGHT: 236 LBS | SYSTOLIC BLOOD PRESSURE: 128 MMHG | DIASTOLIC BLOOD PRESSURE: 84 MMHG | HEIGHT: 71 IN | HEART RATE: 92 BPM | BODY MASS INDEX: 33.04 KG/M2

## 2022-04-01 DIAGNOSIS — N40.1 BPH WITH OBSTRUCTION/LOWER URINARY TRACT SYMPTOMS: Primary | ICD-10-CM

## 2022-04-01 DIAGNOSIS — R97.20 ELEVATED PROSTATE SPECIFIC ANTIGEN (PSA): ICD-10-CM

## 2022-04-01 DIAGNOSIS — N13.8 BPH WITH OBSTRUCTION/LOWER URINARY TRACT SYMPTOMS: Primary | ICD-10-CM

## 2022-04-01 LAB
SL AMB  POCT GLUCOSE, UA: NORMAL
SL AMB LEUKOCYTE ESTERASE,UA: NORMAL
SL AMB POCT BILIRUBIN,UA: NORMAL
SL AMB POCT BLOOD,UA: NORMAL
SL AMB POCT CLARITY,UA: CLEAR
SL AMB POCT COLOR,UA: YELLOW
SL AMB POCT KETONES,UA: NORMAL
SL AMB POCT NITRITE,UA: NORMAL
SL AMB POCT PH,UA: 5
SL AMB POCT SPECIFIC GRAVITY,UA: 1.02
SL AMB POCT URINE PROTEIN: NORMAL
SL AMB POCT UROBILINOGEN: NORMAL

## 2022-04-01 PROCEDURE — 3008F BODY MASS INDEX DOCD: CPT | Performed by: UROLOGY

## 2022-04-01 PROCEDURE — 1036F TOBACCO NON-USER: CPT | Performed by: UROLOGY

## 2022-04-01 PROCEDURE — 52000 CYSTOURETHROSCOPY: CPT | Performed by: UROLOGY

## 2022-04-01 PROCEDURE — 81002 URINALYSIS NONAUTO W/O SCOPE: CPT | Performed by: UROLOGY

## 2022-04-01 PROCEDURE — 99214 OFFICE O/P EST MOD 30 MIN: CPT | Performed by: UROLOGY

## 2022-04-01 NOTE — PROGRESS NOTES
HISTORY:    Further evaluation of large BPH, poor response to medications  ASSESSMENT / PLAN:    Cysto shows quite large prostate with large median lobe up into bladder  Moderate trabeculations  Options discussed, if patient would have a procedure, TURP would be the best choice for him  Median lobe far too big for Uro lift  We explained potential complications infection bleeding scar tissue, 10% chance of needing further surgery later in life, retrograde ejaculation all discussed in detail    Patient like to think about it, he will call if he wants to schedule    The following portions of the patient's history were reviewed and updated as appropriate: allergies, current medications, past family history, past medical history, past social history, past surgical history and problem list     Review of Systems   All other systems reviewed and are negative  Objective:      Cystoscopy     Date/Time 4/1/2022 10:01 AM     Performed by  Danitza Khan MD     Authorized by Danitza Khan MD          Procedure Details:  Procedure type: cystoscopy    Patient tolerance: Patient tolerated the procedure well with no immediate complications    Additional Procedure Details:      Patient presents for cystoscopy  I have discussed the reasons for doing the test, and the potential risks and complications  Patient expressed understanding, and signed informed consent document  The patient was carefully  positioned supine on the examining table  Sterile preparation was performed on the urethra  Xylocaine jelly was instilled and left  Indwelling for the procedure  The 13 [place] flexible cystoscope was passed with the following findings:      Urethra:  No stricture    Prostate:  lateral lobes and intravesical median lobe all severely enlarged                   Bladder:  Severe trabeculation, no lesions, tumor, or stones       Residual urine:  50 mL    Patient tolerated the procedure well and was escorted from the examining table  Physical Exam  Constitutional:       General: He is not in acute distress  Appearance: He is well-developed  He is not diaphoretic  HENT:      Head: Normocephalic and atraumatic  Eyes:      General: No scleral icterus  Pulmonary:      Effort: Pulmonary effort is normal    Genitourinary:     Comments: Penis testes normal  Skin:     Coloration: Skin is not pale  Neurological:      Mental Status: He is alert and oriented to person, place, and time  Psychiatric:         Behavior: Behavior normal          Thought Content: Thought content normal          Judgment: Judgment normal              0   Lab Value Date/Time    PSA 2 5 01/15/2022 1043    PSA 2 4 01/22/2021 1109    PSA 2 4 05/28/2019 1010   ]  BUN   Date Value Ref Range Status   04/16/2021 13 6 - 24 mg/dL Final     Creatinine   Date Value Ref Range Status   04/16/2021 1 08 0 76 - 1 27 mg/dL Final     No components found for: CBC      Patient Active Problem List   Diagnosis    S/P inguinal hernia repair    Vitamin D deficiency    Vitamin B12 deficiency    Benign prostatic hyperplasia with lower urinary tract symptoms    Erectile disorder due to medical condition in male    Elevated PSA        Diagnoses and all orders for this visit:    BPH with obstruction/lower urinary tract symptoms  -     POCT urine dip    Elevated prostate specific antigen (PSA)           Patient ID: Neelam Ramirez is a 46 y o  male        Current Outpatient Medications:     Biotin 10 MG TABS, Take by mouth, Disp: , Rfl:     calcium carbonate-vitamin D (OSCAL-D) 500 mg-200 units per tablet, Take 1 tablet by mouth 2 (two) times a day with meals, Disp: , Rfl:     cholecalciferol (VITAMIN D3) 400 units tablet, Take 400 Units by mouth daily, Disp: , Rfl:     clotrimazole-betamethasone (LOTRISONE) 1-0 05 % cream, Apply 1 application topically 2 (two) times a day To affected area, Disp: 135 g, Rfl: 1    Cyanocobalamin (VITAMIN B 12 PO), Take by mouth, Disp: , Rfl:     cyclobenzaprine (FLEXERIL) 10 mg tablet, Take 1 tablet (10 mg total) by mouth 3 (three) times a day as needed for muscle spasms, Disp: 90 tablet, Rfl: 3    Diclofenac Sodium 3 % GEL, Apply 4 g topically 2 (two) times a day, Disp: 100 g, Rfl: 10    finasteride (PROSCAR) 5 mg tablet, TAKE 1 TABLET DAILY, Disp: 90 tablet, Rfl: 3    Glucosamine-Chondroitin (GLUCOSAMINE CHONDR COMPLEX PO), Take by mouth, Disp: , Rfl:     Multiple Vitamin (MULTIVITAMIN) capsule, Take 1 capsule by mouth daily, Disp: , Rfl:     sildenafil (VIAGRA) 50 MG tablet, Take 2 tablets (100 mg total) by mouth as needed for erectile dysfunction, Disp: 21 tablet, Rfl: 3    tamsulosin (FLOMAX) 0 4 mg, TAKE 1 CAPSULE DAILY WITH DINNER, Disp: 90 capsule, Rfl: 3    Past Medical History:   Diagnosis Date    H/O vasectomy     Inguinal hernia, left 02/19/2018    CLEMENTINE AIKEN MD       Past Surgical History:   Procedure Laterality Date    APPENDECTOMY      CHOLECYSTECTOMY      GASTRECTOMY      gastric bypass    [facility]  [facility]      HAND SURGERY      KNEE ARTHROSCOPY      AR REPAIR ING HERNIA,5+Y/O,REDUCIBL Left 2/27/2018    Procedure: REPAIR HERNIA INGUINAL WITH MESH;  Surgeon: Veronica Leblanc MD;  Location:  MAIN OR;  Service: General       Social History

## 2022-04-06 ENCOUNTER — TELEPHONE (OUTPATIENT)
Dept: GASTROENTEROLOGY | Facility: MEDICAL CENTER | Age: 52
End: 2022-04-06

## 2022-04-06 NOTE — TELEPHONE ENCOUNTER
Please provide insurance referral for patient who has upcoming procedure with our GI group -- this patient has KHPE and requires 2 separate referrals  One made to the facility, and one made to the practice, both with the information below:     Date of apt: 04/22/2022     Dx codes: r19 5     Procedure CPT codes: 06986 [id number]  [id number]     ** if unable to add all CPT codes on procedure line, please add additional CPT's on notes section of referral**     fac NPI: [id number]     Practice NPI: [id number]      Provider: Dr Mariya Hickman     Thank you!      C/ Feli 66

## 2022-04-14 DIAGNOSIS — R19.5 POSITIVE COLORECTAL CANCER SCREENING USING COLOGUARD TEST: Primary | ICD-10-CM

## 2022-04-15 NOTE — TELEPHONE ENCOUNTER
Scheduled date of colonoscopy (as of today): 4/22/22  Physician performing colonoscopy: Dr Ginny Ortiz  Location of colonoscopy: [facility]  Bowel prep reviewed with patient: Karen  Instructions reviewed with patient by: Colleen Zelaya  Clearances: none

## 2022-04-20 ENCOUNTER — NURSE TRIAGE (OUTPATIENT)
Dept: OTHER | Facility: OTHER | Age: 52
End: 2022-04-20

## 2022-04-20 NOTE — TELEPHONE ENCOUNTER
Spoke to wife  Advised gastric bypass not a concern  Should not chew tobacco once started clear liquids  Reviewed green jello okay

## 2022-04-20 NOTE — TELEPHONE ENCOUNTER
Please call patient's wife to advise       Reason for Disposition   [1] Caller requesting NON-URGENT health information AND [2] PCP's office is the best resource    Protocols used: INFORMATION ONLY CALL - NO TRIAGE-ADULT-

## 2022-04-20 NOTE — TELEPHONE ENCOUNTER
Regarding: colonoscopy questions   ----- Message from Perlita Richmond RN sent at 4/20/2022  8:13 AM EDT -----  Patient is scheduled for a colonoscopy on Friday  Patient's wife is asking a few questions  1  "He had a gastric bypass  Are there any special instructions for the prep?"     2  "He chews tobacco  Can he have it before the procedure?"    3  "It says you can't have any purple, red, or yellow jello  They all have a little in it   What can I give him?"

## 2022-04-22 ENCOUNTER — ANESTHESIA EVENT (OUTPATIENT)
Dept: GASTROENTEROLOGY | Facility: AMBULATORY SURGERY CENTER | Age: 52
End: 2022-04-22

## 2022-04-22 ENCOUNTER — HOSPITAL ENCOUNTER (OUTPATIENT)
Dept: GASTROENTEROLOGY | Facility: AMBULATORY SURGERY CENTER | Age: 52
Discharge: HOME | End: 2022-04-22
Payer: COMMERCIAL

## 2022-04-22 ENCOUNTER — ANESTHESIA (OUTPATIENT)
Dept: GASTROENTEROLOGY | Facility: AMBULATORY SURGERY CENTER | Age: 52
End: 2022-04-22

## 2022-04-22 VITALS
DIASTOLIC BLOOD PRESSURE: 75 MMHG | TEMPERATURE: 97.5 F | SYSTOLIC BLOOD PRESSURE: 127 MMHG | HEART RATE: 73 BPM | RESPIRATION RATE: 16 BRPM | OXYGEN SATURATION: 98 %

## 2022-04-22 DIAGNOSIS — R19.5 POSITIVE COLORECTAL CANCER SCREENING USING COLOGUARD TEST: ICD-10-CM

## 2022-04-22 PROBLEM — E66.9 OBESITY (BMI 30.0-34.9): Status: ACTIVE | Noted: 2022-04-22

## 2022-04-22 PROBLEM — E66.811 OBESITY (BMI 30.0-34.9): Status: ACTIVE | Noted: 2022-04-22

## 2022-04-22 PROCEDURE — 45385 COLONOSCOPY W/LESION REMOVAL: CPT | Performed by: INTERNAL MEDICINE

## 2022-04-22 RX ORDER — SODIUM CHLORIDE, SODIUM LACTATE, POTASSIUM CHLORIDE, CALCIUM CHLORIDE 600; 310; 30; 20 MG/100ML; MG/100ML; MG/100ML; MG/100ML
50 INJECTION, SOLUTION INTRAVENOUS CONTINUOUS
Status: DISCONTINUED | OUTPATIENT
Start: 2022-04-22 | End: 2022-04-26 | Stop reason: HOSPADM

## 2022-04-22 RX ORDER — SODIUM CHLORIDE, SODIUM LACTATE, POTASSIUM CHLORIDE, CALCIUM CHLORIDE 600; 310; 30; 20 MG/100ML; MG/100ML; MG/100ML; MG/100ML
INJECTION, SOLUTION INTRAVENOUS CONTINUOUS PRN
Status: DISCONTINUED | OUTPATIENT
Start: 2022-04-22 | End: 2022-04-22

## 2022-04-22 RX ORDER — PROPOFOL 10 MG/ML
INJECTION, EMULSION INTRAVENOUS AS NEEDED
Status: DISCONTINUED | OUTPATIENT
Start: 2022-04-22 | End: 2022-04-22

## 2022-04-22 RX ADMIN — PROPOFOL 100 MG: 10 INJECTION, EMULSION INTRAVENOUS at 09:14

## 2022-04-22 RX ADMIN — PROPOFOL 100 MG: 10 INJECTION, EMULSION INTRAVENOUS at 09:17

## 2022-04-22 RX ADMIN — PROPOFOL 100 MG: 10 INJECTION, EMULSION INTRAVENOUS at 09:19

## 2022-04-22 RX ADMIN — SODIUM CHLORIDE, SODIUM LACTATE, POTASSIUM CHLORIDE, CALCIUM CHLORIDE: 600; 310; 30; 20 INJECTION, SOLUTION INTRAVENOUS at 09:12

## 2022-04-22 RX ADMIN — SODIUM CHLORIDE, SODIUM LACTATE, POTASSIUM CHLORIDE, CALCIUM CHLORIDE 50 ML/HR: 600; 310; 30; 20 INJECTION, SOLUTION INTRAVENOUS at 08:57

## 2022-04-22 NOTE — H&P
History and Physical - [facility]  Karlos Villanueva 46 y o  male MRN: [id number]    HPI: Karlos Villanueva is a 46y o  year old male who presents for colonoscopy for positive Cologuard    REVIEW OF SYSTEMS: Per the HPI, and otherwise unremarkable  Historical Information   Past Medical History:   Diagnosis Date    Enlarged prostate    Mary Valdes H/O vasectomy     Inguinal hernia, left 02/19/2018    CLEMENTINE AIKEN MD     Past Surgical History:   Procedure Laterality Date    APPENDECTOMY      CHOLECYSTECTOMY      GASTRECTOMY      gastric bypass    [facility]      HAND SURGERY      KNEE ARTHROSCOPY      MN REPAIR ING HERNIA,5+Y/O,REDUCIBL Left 2/27/2018    Procedure: REPAIR HERNIA INGUINAL WITH MESH;  Surgeon: Sangita Ceballos MD;  Location: [facility] OR;  Service: General     Social History   Social History     Substance and Sexual Activity   Alcohol Use Yes    Comment: occasionally     Social History     Substance and Sexual Activity   Drug Use No     Social History     Tobacco Use   Smoking Status Never Smoker   Smokeless Tobacco Current User    Types: Snuff   Tobacco Comment    one can/day, FORMER SMOKER AS PER [employer]     Family History   Problem Relation Age of Onset    Cancer Mother     Hypertension Mother     Thyroid disease Mother     Hypothyroidism Mother     Diabetes Father         TYPE 2, CONTROLLED    Hypertension Father     Prostate cancer Father     COPD Sister     Mental illness Neg Hx     Substance Abuse Neg Hx        Meds/Allergies       Current Outpatient Medications:     Biotin 10 MG TABS    calcium carbonate-vitamin D (OSCAL-D) 500 mg-200 units per tablet    cholecalciferol (VITAMIN D3) 400 units tablet    Cyanocobalamin (VITAMIN B 12 PO)    cyclobenzaprine (FLEXERIL) 10 mg tablet    finasteride (PROSCAR) 5 mg tablet    Glucosamine-Chondroitin (GLUCOSAMINE CHONDR COMPLEX PO)    Multiple Vitamin (MULTIVITAMIN) capsule   polyethylene glycol (COLYTE) 4000 mL solution    tamsulosin (FLOMAX) 0 4 mg    clotrimazole-betamethasone (LOTRISONE) 1-0 05 % cream    Diclofenac Sodium 3 % GEL    sildenafil (VIAGRA) 50 MG tablet    Current Facility-Administered Medications:     lactated ringers infusion, 50 mL/hr, Intravenous, Continuous, 50 mL/hr at 04/22/22 0857    No Known Allergies    Objective     /73   Pulse 64   Temp 97 5 °F (36 4 °C) (Temporal)   Resp 18   SpO2 99%     PHYSICAL EXAM    Gen: NAD AAOx3  Head: Normocephalic, Atraumatic  CV: S1S2 RRR no m/r/g  CHEST: Clear b/l no c/r/w  ABD: soft, +BS NT/ND  EXT: no edema    ASSESSMENT/PLAN:  This is a 46y o  year old male here for colonoscopy, and he is stable and optimized for his procedure

## 2022-04-22 NOTE — ANESTHESIA POSTPROCEDURE EVALUATION
Post-Op Assessment Note    CV Status:  Stable  Pain Score: 0    Pain management: adequate     Mental Status:  Alert and awake   Hydration Status:  Euvolemic   PONV Controlled:  Controlled   Airway Patency:  Patent      Post Op Vitals Reviewed: Yes            No complications documented      BP   108/60   Temp      Pulse  72   Resp   16   SpO2   99

## 2022-04-22 NOTE — ANESTHESIA PREPROCEDURE EVALUATION
Procedure:  COLONOSCOPY    Relevant Problems   /RENAL   (+) Benign prostatic hyperplasia with lower urinary tract symptoms      Other   (+) Obesity (BMI 30 0-34  9)        Physical Exam    Airway    Mallampati score: II  TM Distance: >3 FB  Neck ROM: full     Dental   No notable dental hx     Cardiovascular  Cardiovascular exam normal    Pulmonary  Pulmonary exam normal     Other Findings        Anesthesia Plan  ASA Score- 2     Anesthesia Type- IV sedation with anesthesia with ASA Monitors  Additional Monitors:   Airway Plan:           Plan Factors-    Chart reviewed  Patient is not a current smoker  Induction- intravenous  Postoperative Plan-     Informed Consent- Anesthetic plan and risks discussed with patient  I personally reviewed this patient with the CRNA  Discussed and agreed on the Anesthesia Plan with the CRNA  Mu Valencia

## 2022-04-22 NOTE — DISCHARGE INSTRUCTIONS
Hemorrhoids   WHAT YOU NEED TO KNOW:   What are hemorrhoids? Hemorrhoids are swollen blood vessels inside your rectum (internal hemorrhoids) or on your anus (external hemorrhoids)  Sometimes a hemorrhoid may prolapse  This means it extends out of your anus  What increases my risk for hemorrhoids? · Pregnancy or obesity    · Straining or sitting for a long time during bowel movements    · Liver disease    · Weak muscles around the anus caused by older age, rectal surgery, or anal intercourse    · A lack of physical activity    · Chronic diarrhea or constipation    · A low-fiber diet    What are the signs and symptoms of hemorrhoids? · Pain or itching around your anus or inside your rectum    · Swelling or bumps around your anus    · Bright red blood in your bowel movement, on the toilet paper, or in the toilet bowl    · Tissue bulging out of your anus (prolapsed hemorrhoids)    · Incontinence (poor control over urine or bowel movements)    How are hemorrhoids diagnosed? Your healthcare provider will ask about your symptoms, the foods you eat, and your bowel movements  He or she will examine your anus for external hemorrhoids  You may need the following:  · A digital rectal exam  is a test to check for hemorrhoids  Your healthcare provider will put a gloved finger inside your anus to feel for the hemorrhoids  · An anoscopy  is a test that uses a scope (small tube with a light and camera on the end) to look at your hemorrhoids  How are hemorrhoids treated? Treatment will depend on your symptoms  You may need any of the following:  · Medicines  can help decrease pain and swelling, and soften your bowel movement  The medicine may be a pill, pad, cream, or ointment  · Procedures  may be used to shrink or remove your hemorrhoid  Examples include rubber-band ligation, sclerotherapy, and photocoagulation  These procedures may be done in your healthcare provider's office   Ask your healthcare provider for more information about these procedures  · Surgery  may be needed to shrink or remove your hemorrhoids  How can I manage my symptoms? · Apply ice on your anus for 15 to 20 minutes every hour or as directed  Use an ice pack, or put crushed ice in a plastic bag  Cover it with a towel before you apply it to your anus  Ice helps prevent tissue damage and decreases swelling and pain  · Take a sitz bath  Fill a bathtub with 4 to 6 inches of warm water  You may also use a sitz bath pan that fits inside a toilet bowl  Sit in the sitz bath for 15 minutes  Do this 3 times a day, and after each bowel movement  The warm water can help decrease pain and swelling  · Keep your anal area clean  Gently wash the area with warm water daily  Soap may irritate the area  After a bowel movement, wipe with moist towelettes or wet toilet paper  Dry toilet paper can irritate the area  How can I help prevent hemorrhoids? · Do not strain to have a bowel movement  Do not sit on the toilet too long  These actions can increase pressure on the tissues in your rectum and anus  · Drink plenty of liquids  Liquids can help prevent constipation  Ask how much liquid to drink each day and which liquids are best for you  · Eat a variety of high-fiber foods  Examples include fruits, vegetables, and whole grains  Ask your healthcare provider how much fiber you need each day  You may need to take a fiber supplement  · Exercise as directed  Exercise, such as walking, may make it easier to have a bowel movement  Ask your healthcare provider to help you create an exercise plan  · Do not have anal sex  Anal sex can weaken the skin around your rectum and anus  · Avoid heavy lifting  This can cause straining and increase your risk for another hemorrhoid  When should I seek immediate care? · You have severe pain in your rectum or around your anus      · You have severe pain in your abdomen and you are vomiting  · You have bleeding from your anus that soaks through your underwear  When should I contact my healthcare provider? · You have frequent and painful bowel movements  · Your hemorrhoid looks or feels more swollen than usual      · You do not have a bowel movement for 2 days or more  · You see or feel tissue coming through your anus  · You have questions or concerns about your condition or care  CARE AGREEMENT:   You have the right to help plan your care  Learn about your health condition and how it may be treated  Discuss treatment options with your healthcare providers to decide what care you want to receive  You always have the right to refuse treatment  The above information is an  only  It is not intended as medical advice for individual conditions or treatments  Talk to your doctor, nurse or pharmacist before following any medical regimen to see if it is safe and effective for you  © Copyright [employer] 2022 Information is for End User's use only and may not be sold, redistributed or otherwise used for commercial purposes  All illustrations and images included in CareNotes® are the copyrighted property of [employer] A M , Inc  or [address] [place], [place] [address]elise   Colorectal Polyps   WHAT YOU NEED TO KNOW:   What are colorectal polyps? Colorectal polyps are small growths of tissue in the lining of the colon and rectum  Most polyps are usually benign (not cancer)  Certain types of polyps, called adenomatous polyps, may turn into cancer  What increases my risk for colorectal polyps? The exact cause of colorectal polyps is unknown   The following may increase your risk:  · Older age    · Foods high in fat and low in fiber    · Family history of polyps    · Intestinal diseases, such as Crohn disease or ulcerative colitis    · Smoking cigarettes or drinking alcohol    · Lack of physical activity, such as exercise    · Obesity    What are the signs and symptoms of colorectal polyps? · Blood in your bowel movement or bleeding from the rectum    · Change in bowel movement habits, such as diarrhea or constipation    · Abdominal pain    What do I need to know about colorectal polyp screening and diagnosis? Screening means you are checked for polyps that may be cancer, even if you do not have signs or symptoms  Screening is recommended starting at age 48 and continuing to age 76 if you are at average risk  Your healthcare provider may suggest screening starting at age 39  Screening may start before you are 45 or continue after you are 75 if your risk is high  Your provider will tell you how often to get screened  Timing depends on the type of screening and if polyps are found  Timing also depends on your age and if you are at increased risk for cancer  Screening may be recommended every 1, 2, 5, or 10 years  Your healthcare provider will need to test polyps to find out if they are cancer  Any of the following may be used to find polyps:  · A digital rectal exam  means your provider will use a finger to check for polyps  · A barium enema  is an x-ray of the colon  A tube is put into your anus, and a liquid called barium is put through the tube  Barium is used so that healthcare providers can see your colon better on the x-ray film  · A virtual colonoscopy  is a CT scan that takes pictures of the inside of your colon and rectum  A small, flexible tube is put into your rectum and air or carbon dioxide (gas) is used to expand your colon  This lets healthcare providers clearly see your colon and any polyps on a monitor  · Colonoscopy or sigmoidoscopy  are procedures to help your provider see the inside of your colon  He or she will use a flexible tube with a small light and camera on the end  During a sigmoidoscopy, your provider will only look at rectum and lower colon  During a colonoscopy, he or she will look at the full length of your colon   A small amount of tissue may be removed from your colon for tests  How are colorectal polyps treated? Small, benign polyps may not need treatment  Your healthcare provider will check the polyp over time to make sure it is not changing  Polyps that are cancer may be removed with one of the following:  · A polypectomy  is a minimally invasive procedure to remove a polyp during a colonoscopy or sigmoidoscopy  Your healthcare provider may need to remove the polyp with a laparoscope  Laparoscopy is done by inserting a small, flexible scope into incisions made on your abdomen  · Surgery  may be needed to remove large or deep polyps that cannot be removed in a polypectomy  Tissues or lymph nodes near a polyp may also be removed  This helps stop cancer from spreading  What can I do to lower my risk for colorectal polyps? · Eat a variety of healthy foods  Healthy foods include fruit, vegetables, whole-grain breads, low-fat dairy products, beans, lean meat, and fish  Ask if you need to be on a special diet  · Maintain a healthy weight  Ask your healthcare provider what a healthy weight is for you  Ask him or her to help with a weight loss plan if you are overweight  · Exercise as directed  Begin to exercise slowly and do more as you get stronger  Talk with your healthcare provider before you start an exercise program          · Limit alcohol  Your risk for polyps increases the more you drink  · Do not smoke  Nicotine and other chemicals in cigarettes and cigars increase your risk for polyps  Ask your healthcare provider for information if you currently smoke and need help to quit  E-cigarettes or smokeless tobacco still contain nicotine  Talk to your healthcare provider before you use these products  Where can I find more information? · [address] ([facility]) [address], [place] [address]  Phone: [phone]  Web Address: Nely Chakraborty  [facility]    Call your local emergency number (911 in the [address]) if:   · You have sudden shortness of breath  · You have a fast heart rate, fast breathing, or are too dizzy to stand up  When should I seek immediate care? · You have severe abdominal pain  · You see blood in your bowel movement  When should I call my doctor? · You have a fever  · You have chills, a cough, or feel weak and achy  · You have abdominal pain that does not go away or gets worse after you take medicine  · Your abdomen is swollen  · You are losing weight without trying  · You have questions or concerns about your condition or care  CARE AGREEMENT:   You have the right to help plan your care  Learn about your health condition and how it may be treated  Discuss treatment options with your healthcare providers to decide what care you want to receive  You always have the right to refuse treatment  The above information is an  only  It is not intended as medical advice for individual conditions or treatments  Talk to your doctor, nurse or pharmacist before following any medical regimen to see if it is safe and effective for you  © Copyright [employer] 2022 Information is for End User's use only and may not be sold, redistributed or otherwise used for commercial purposes  All illustrations and images included in CareNotes® are the copyrighted property of [employer]  or [address] [place] [address] Amanda   Colonoscopy   WHAT YOU NEED TO KNOW:   A colonoscopy is a procedure to examine the inside of your colon (intestine) with a scope  Polyps or tissue growths may have been removed during your colonoscopy  It is normal to feel bloated and to have some abdominal discomfort  You should be passing gas  If you have hemorrhoids or you had polyps removed, you may have a small amount of bleeding  DISCHARGE INSTRUCTIONS:   Seek care immediately if:    You have sudden, severe abdominal pain   You have problems swallowing        You have a large amount of black, sticky bowel movements or blood in your bowel movements   You have sudden trouble breathing   You feel weak, lightheaded, or faint or your heart beats faster than normal for you  Contact your healthcare provider if:    You have a fever and chills   You have nausea or are vomiting   Your abdomen is bloated or feels full and hard   You have abdominal pain   You have black, sticky bowel movements or blood in your bowel movements   You have not had a bowel movement for 3 days after your procedure   You have rash or hives   You have questions or concerns about your procedure  Activity:    Do not lift, strain, or run for 24 hours after your procedure   Rest after your procedure  You have been given medicine to relax you  Do not drive or make important decisions until the day after your procedure  Return to your normal activity as directed   Relieve gas and discomfort from bloating by lying on your right side with a heating pad on your abdomen  You may need to take short walks to help the gas move out  Eat small meals until bloating is relieved  Follow up with your healthcare provider as directed: Write down your questions so you remember to ask them during your visits  If you take a blood thinner, please review the specific instructions from your endoscopist about when you should resume it  These can be found in the Recommendation and Your Medication list sections of this After Visit Summary

## 2022-07-10 ENCOUNTER — NURSE TRIAGE (OUTPATIENT)
Dept: OTHER | Facility: OTHER | Age: 52
End: 2022-07-10

## 2022-07-10 NOTE — TELEPHONE ENCOUNTER
Regarding: joint pain   ----- Message from Conchita Burgos sent at 7/10/2022  8:45 AM EDT -----  " I am having joint pain, low grade fever, a dry cough and body aches  "

## 2022-07-10 NOTE — TELEPHONE ENCOUNTER
Reason for Disposition   [1] COVID-19 infection suspected by caller or triager AND [2] mild symptoms (cough, fever, or others) AND [3] has not gotten tested yet    Answer Assessment - Initial Assessment Questions  1  COVID-19 DIAGNOSIS: "Who made your COVID-19 diagnosis?" "Was it confirmed by a positive lab test or self-test?" If not diagnosed by a doctor (or NP/PA), ask "Are there lots of cases (community spread) where you live?" Note: See [facility] health department website, if unsure  Denies  2  COVID-19 EXPOSURE: "Was there any known exposure to COVID before the symptoms began?" [facility] Definition of close contact: within 6 feet (2 meters) for a total of 15 minutes or more over a 24-hour period  *No Answer*  3  ONSET: "When did the COVID-19 symptoms start?"       Started on Monday , Got worse on Wednesday   4  WORST SYMPTOM: "What is your worst symptom?" (e g , cough, fever, shortness of breath, muscle aches)      Joint pain   5  COUGH: "Do you have a cough?" If Yes, ask: "How bad is the cough?"        Dry cough    FEVER: "Do you have a fever?" If Yes, ask: "What is your temperature, how was it measured, and when did it start?"      Yes low grade 100 0-100 3  7  RESPIRATORY STATUS: "Describe your breathing?" (e g , shortness of breath, wheezing, unable to speak)       Denies  9  HIGH RISK DISEASE: "Do you have any chronic medical problems?" (e g , asthma, heart or lung disease, weak immune system, obesity, etc )        10  VACCINE: "Have you had the COVID-19 vaccine?" If Yes, ask: "Which one, how many shots, when did you get it?"        *No Answer*  11   BOOSTER: "Have you received your COVID-19 booster?" If Yes, ask: "Which one and when did you get it?"        *No Answer*  13  OTHER SYMPTOMS: "Do you have any other symptoms?"  (e g , chills, fatigue, headache, loss of smell or taste, muscle pain, sore throat)        Achy , Joint pain  Red rash on thighs yesterday    Protocols used: CORONAVIRUS (COVID-19) DIAGNOSED OR SUSPECTED-ADULT-AH

## 2022-07-10 NOTE — TELEPHONE ENCOUNTER
Pt calling in with COVID like sx's since Monday 7 4 22  Pt took a home test on Wednesday which was negative  Pt is now c/o joint pain with a low grade fever, cough and congestion  [place] [facility] [place]  Pt would like the office to call him tomorrow morning to schedule a OV with is PCP  Please advise

## 2022-07-11 ENCOUNTER — TELEPHONE (OUTPATIENT)
Dept: FAMILY MEDICINE CLINIC | Facility: CLINIC | Age: 52
End: 2022-07-11

## 2022-07-11 DIAGNOSIS — D50.9 IRON DEFICIENCY ANEMIA, UNSPECIFIED IRON DEFICIENCY ANEMIA TYPE: ICD-10-CM

## 2022-07-11 DIAGNOSIS — M25.50 ARTHRALGIA, UNSPECIFIED JOINT: ICD-10-CM

## 2022-07-11 DIAGNOSIS — Z13.6 SCREENING FOR CARDIOVASCULAR CONDITION: ICD-10-CM

## 2022-07-11 DIAGNOSIS — Z00.00 WELLNESS EXAMINATION: Primary | ICD-10-CM

## 2022-07-11 NOTE — TELEPHONE ENCOUNTER
Pt's wife called in to request an earlier appmt and lab orders  Pt's wife requests annual blood work plus a lyme test be sent to the lab  Lyme test is requested due to pt's current right side joint pain and fevers  Dr Manuel Downey, would you like the annual labs plus a lyme test ordered ahead of the 7/13/2022 OV? Please advise

## 2022-07-13 ENCOUNTER — OFFICE VISIT (OUTPATIENT)
Dept: FAMILY MEDICINE CLINIC | Facility: CLINIC | Age: 52
End: 2022-07-13
Payer: COMMERCIAL

## 2022-07-13 VITALS
DIASTOLIC BLOOD PRESSURE: 84 MMHG | TEMPERATURE: 98.8 F | OXYGEN SATURATION: 97 % | SYSTOLIC BLOOD PRESSURE: 122 MMHG | HEART RATE: 106 BPM

## 2022-07-13 DIAGNOSIS — M79.10 MYALGIA: Primary | ICD-10-CM

## 2022-07-13 DIAGNOSIS — E55.9 VITAMIN D DEFICIENCY: ICD-10-CM

## 2022-07-13 DIAGNOSIS — M25.50 ARTHRALGIA, UNSPECIFIED JOINT: ICD-10-CM

## 2022-07-13 DIAGNOSIS — Z98.84 STATUS POST GASTRIC BYPASS FOR OBESITY: ICD-10-CM

## 2022-07-13 DIAGNOSIS — E53.8 VITAMIN B12 DEFICIENCY: ICD-10-CM

## 2022-07-13 PROBLEM — E78.2 MIXED HYPERLIPIDEMIA: Status: ACTIVE | Noted: 2022-07-13

## 2022-07-13 PROCEDURE — 99214 OFFICE O/P EST MOD 30 MIN: CPT | Performed by: FAMILY MEDICINE

## 2022-07-13 NOTE — PROGRESS NOTES
[address]        NAME: Alee Ghotra is a 46 y o  male  : 1970    MRN: [id number]  DATE: 2022  TIME: 3:45 PM    Assessment and Plan   Myalgia [M79 10]  1  Myalgia  CBC and differential    Comprehensive metabolic panel    Lyme Antibody Profile with reflex to WB    Hepatitis C antibody    C-reactive protein    CK (with reflex to MB)    CBC and differential    Comprehensive metabolic panel    Lyme Antibody Profile with reflex to WB    Hepatitis C antibody    C-reactive protein    CK (with reflex to MB)   2  Arthralgia, unspecified joint  CBC and differential    Comprehensive metabolic panel    Lyme Antibody Profile with reflex to WB    Hepatitis C antibody    C-reactive protein    LORE w/Reflex if Positive    CBC and differential    Comprehensive metabolic panel    Lyme Antibody Profile with reflex to WB    Hepatitis C antibody    C-reactive protein    LORE w/Reflex if Positive    Uric acid    Uric acid   3  Vitamin D deficiency  Vitamin D Panel    Vitamin D Panel   4  Vitamin B12 deficiency  Vitamin B12    Vitamin B12   5  Status post gastric bypass for obesity  CBC and differential    Comprehensive metabolic panel    Ferritin    Vitamin D Panel    Vitamin B12    Folate    CBC and differential    Comprehensive metabolic panel    Ferritin    Vitamin D Panel    Vitamin B12    Folate       No problem-specific Assessment & Plan notes found for this encounter  Patient Instructions     Patient Instructions   Get labs as ordered  Treatment will be based upon those results  Chief Complaint     Chief Complaint   Patient presents with    Joint Pain     X5 days after feeling very fatigued for a few days -- right sided knee, wrist  Had body aches, chills, fever to 100 8F         History of Present Illness       Here for evaluation of 7-8 days of fevers, rash, muscle aches and arthalgia-to home COVID test have been negative    Does report having intermittent viral/COVID symptoms over the last year  Patient is vaccinated with J and J vaccine  Also had COVID in January 2021  Patient does spend a lot of time outside  There been no recognized tick or other insect bites  There is no family history of rheumatoid disease  Review of Systems   Review of Systems   Constitutional: Positive for chills, fatigue and fever  Negative for appetite change and diaphoresis  HENT: Negative for ear pain, rhinorrhea, sinus pressure and sore throat  Eyes: Negative for discharge, redness and itching  Respiratory: Negative for cough, shortness of breath and wheezing  Cardiovascular: Negative for chest pain and palpitations  Rapid or slow heart rate   Gastrointestinal: Negative for abdominal pain, diarrhea, nausea and vomiting  Genitourinary: Negative for dysuria, frequency, hematuria and urgency  Musculoskeletal: Positive for arthralgias and myalgias           Current Medications       Current Outpatient Medications:     Biotin 10 MG TABS, Take by mouth, Disp: , Rfl:     cholecalciferol (VITAMIN D3) 400 units tablet, Take 400 Units by mouth daily, Disp: , Rfl:     clotrimazole-betamethasone (LOTRISONE) 1-0 05 % cream, Apply 1 application topically 2 (two) times a day To affected area, Disp: 135 g, Rfl: 1    Cyanocobalamin (VITAMIN B 12 PO), Take by mouth, Disp: , Rfl:     cyclobenzaprine (FLEXERIL) 10 mg tablet, Take 1 tablet (10 mg total) by mouth 3 (three) times a day as needed for muscle spasms, Disp: 90 tablet, Rfl: 3    finasteride (PROSCAR) 5 mg tablet, TAKE 1 TABLET DAILY, Disp: 90 tablet, Rfl: 3    Glucosamine-Chondroitin (GLUCOSAMINE CHONDR COMPLEX PO), Take by mouth, Disp: , Rfl:     Multiple Vitamin (MULTIVITAMIN) capsule, Take 1 capsule by mouth daily, Disp: , Rfl:     sildenafil (VIAGRA) 50 MG tablet, Take 2 tablets (100 mg total) by mouth as needed for erectile dysfunction, Disp: 21 tablet, Rfl: 3    tamsulosin (FLOMAX) 0 4 mg, TAKE 1 CAPSULE DAILY WITH DINNER, Disp: 90 capsule, Rfl: 3    Current Allergies     Allergies as of 07/13/2022    (No Known Allergies)            The following portions of the patient's history were reviewed and updated as appropriate: allergies, current medications, past family history, past medical history, past social history, past surgical history and problem list      Past Medical History:   Diagnosis Date    Enlarged prostate    [facility]asectomy     Inguinal hernia, left 02/19/2018    CLEMENTINE AIKEN MD       Past Surgical History:   Procedure Laterality Date    APPENDECTOMY      CHOLECYSTECTOMY      GASTRECTOMY      gastric bypass    [facility]IC BYPASS  2012    S  [facility]   HAND SURGERY      KNEE ARTHROSCOPY      [place] REPAIR ING HERNIA,5+Y/O,REDUCIBL Left 2/27/2018    Procedure: REPAIR HERNIA INGUINAL WITH MESH;  Surgeon: Raymon Arenas MD;  Location: [facility] OR;  Service: General       Family History   Problem Relation Age of Onset    Cancer Mother     Hypertension Mother     Thyroid disease Mother     Hypothyroidism Mother     Diabetes Father         TYPE 2, CONTROLLED    Hypertension Father     Prostate cancer Father     COPD Sister     Mental illness Neg Hx     Substance Abuse Neg Hx          Medications have been verified  Objective   /84 (BP Location: Right arm, Patient Position: Sitting, Cuff Size: Large)   Pulse (!) 106   Temp 98 8 °F (37 1 °C) (Oral)   SpO2 97%        Physical Exam     Physical Exam  Vitals reviewed  Constitutional:       General: He is not in acute distress  Appearance: He is well-developed  He is not diaphoretic  HENT:      Head: Normocephalic and atraumatic  Right Ear: Tympanic membrane, ear canal and external ear normal       Left Ear: Tympanic membrane, ear canal and external ear normal       Nose: No mucosal edema or rhinorrhea  Right Sinus: No maxillary sinus tenderness        Left Sinus: No maxillary sinus tenderness  Mouth/Throat:      Mouth: Mucous membranes are not pale and not dry  Dentition: Normal dentition  Pharynx: Uvula midline  No oropharyngeal exudate  Eyes:      General:         Right eye: No discharge  Left eye: No discharge  Pupils: Pupils are equal, round, and reactive to light  Neck:      Thyroid: No thyromegaly  Vascular: No carotid bruit  Cardiovascular:      Rate and Rhythm: Normal rate and regular rhythm  Heart sounds: Normal heart sounds  No murmur heard  Pulmonary:      Effort: Pulmonary effort is normal  No respiratory distress  Breath sounds: Normal breath sounds  No wheezing or rales  Abdominal:      General: There is no distension  Palpations: Abdomen is soft  Tenderness: There is no abdominal tenderness  There is no guarding  Musculoskeletal:      Right shoulder: No deformity or tenderness  Normal range of motion  Left shoulder: No deformity or tenderness  Normal range of motion  Right wrist: Swelling and tenderness present  No deformity  Decreased range of motion  Right hand: Bony tenderness present  No swelling  Normal range of motion  Left hand: Bony tenderness present  No swelling  Decreased range of motion  Cervical back: Normal range of motion and neck supple  No tenderness  Right lower leg: No edema  Left lower leg: No edema  Lymphadenopathy:      Cervical: No cervical adenopathy  Neurological:      Mental Status: He is alert and oriented to person, place, and time  Cranial Nerves: No cranial nerve deficit     Psychiatric:         Behavior: Behavior normal

## 2022-07-14 ENCOUNTER — TELEPHONE (OUTPATIENT)
Dept: FAMILY MEDICINE CLINIC | Facility: CLINIC | Age: 52
End: 2022-07-14

## 2022-07-14 LAB — URATE SERPL-MCNC: 5 MG/DL (ref 3.8–8.4)

## 2022-07-14 NOTE — TELEPHONE ENCOUNTER
Patient called in to see if we can give the patient a call back with blood work results because they're having trouble with MyChart

## 2022-07-18 LAB
25(OH)D2 SERPL-MCNC: <1 NG/ML
25(OH)D3 SERPL-MCNC: 37 NG/ML
25(OH)D3+25(OH)D2 SERPL-MCNC: 37 NG/ML
ALBUMIN SERPL-MCNC: 4.1 G/DL (ref 3.8–4.9)
ALBUMIN/GLOB SERPL: 1.5 {RATIO} (ref 1.2–2.2)
ALP SERPL-CCNC: 66 IU/L (ref 44–121)
ALT SERPL-CCNC: 17 IU/L (ref 0–44)
ANA SER QL: NEGATIVE
AST SERPL-CCNC: 16 IU/L (ref 0–40)
B BURGDOR IGG PATRN SER IB-IMP: NEGATIVE
B BURGDOR IGM PATRN SER IB-IMP: NEGATIVE
B BURGDOR18KD IGG SER QL IB: ABNORMAL
B BURGDOR23KD IGG SER QL IB: ABNORMAL
B BURGDOR23KD IGM SER QL IB: ABNORMAL
B BURGDOR28KD IGG SER QL IB: ABNORMAL
B BURGDOR30KD IGG SER QL IB: ABNORMAL
B BURGDOR39KD IGG SER QL IB: ABNORMAL
B BURGDOR39KD IGM SER QL IB: ABNORMAL
B BURGDOR41KD IGG SER QL IB: ABNORMAL
B BURGDOR41KD IGM SER QL IB: ABNORMAL
B BURGDOR45KD IGG SER QL IB: ABNORMAL
B BURGDOR58KD IGG SER QL IB: ABNORMAL
B BURGDOR66KD IGG SER QL IB: ABNORMAL
B BURGDOR93KD IGG SER QL IB: PRESENT
BASOPHILS # BLD AUTO: 0.1 X10E3/UL (ref 0–0.2)
BASOPHILS NFR BLD AUTO: 1 %
BILIRUB SERPL-MCNC: 0.4 MG/DL (ref 0–1.2)
BUN SERPL-MCNC: 17 MG/DL (ref 6–24)
BUN/CREAT SERPL: 18 (ref 9–20)
CALCIUM SERPL-MCNC: 9.2 MG/DL (ref 8.7–10.2)
CHLORIDE SERPL-SCNC: 96 MMOL/L (ref 96–106)
CK SERPL-CCNC: 54 U/L (ref 41–331)
CO2 SERPL-SCNC: 24 MMOL/L (ref 20–29)
CREAT SERPL-MCNC: 0.92 MG/DL (ref 0.76–1.27)
CRP SERPL-MCNC: 82 MG/L (ref 0–10)
EGFR: 100 ML/MIN/1.73
EOSINOPHIL # BLD AUTO: 0.1 X10E3/UL (ref 0–0.4)
EOSINOPHIL NFR BLD AUTO: 1 %
ERYTHROCYTE [DISTWIDTH] IN BLOOD BY AUTOMATED COUNT: 12.3 % (ref 11.6–15.4)
FERRITIN SERPL-MCNC: 346 NG/ML (ref 30–400)
FOLATE SERPL-MCNC: >20 NG/ML
GLOBULIN SER-MCNC: 2.8 G/DL (ref 1.5–4.5)
GLUCOSE SERPL-MCNC: 108 MG/DL (ref 65–99)
HCT VFR BLD AUTO: 43.2 % (ref 37.5–51)
HCV AB S/CO SERPL IA: <0.1 S/CO RATIO (ref 0–0.9)
HGB BLD-MCNC: 14.7 G/DL (ref 13–17.7)
IMM GRANULOCYTES # BLD: 0.1 X10E3/UL (ref 0–0.1)
IMM GRANULOCYTES NFR BLD: 1 %
LYMPHOCYTES # BLD AUTO: 1.6 X10E3/UL (ref 0.7–3.1)
LYMPHOCYTES NFR BLD AUTO: 14 %
MCH RBC QN AUTO: 30.8 PG (ref 26.6–33)
MCHC RBC AUTO-ENTMCNC: 34 G/DL (ref 31.5–35.7)
MCV RBC AUTO: 91 FL (ref 79–97)
MONOCYTES # BLD AUTO: 1 X10E3/UL (ref 0.1–0.9)
MONOCYTES NFR BLD AUTO: 8 %
NEUTROPHILS # BLD AUTO: 8.7 X10E3/UL (ref 1.4–7)
NEUTROPHILS NFR BLD AUTO: 75 %
PLATELET # BLD AUTO: 410 X10E3/UL (ref 150–450)
POTASSIUM SERPL-SCNC: 4.5 MMOL/L (ref 3.5–5.2)
PROT SERPL-MCNC: 6.9 G/DL (ref 6–8.5)
RBC # BLD AUTO: 4.77 X10E6/UL (ref 4.14–5.8)
SODIUM SERPL-SCNC: 136 MMOL/L (ref 134–144)
VIT B12 SERPL-MCNC: 1232 PG/ML (ref 232–1245)
WBC # BLD AUTO: 11.5 X10E3/UL (ref 3.4–10.8)

## 2022-07-19 ENCOUNTER — NURSE TRIAGE (OUTPATIENT)
Dept: OTHER | Facility: OTHER | Age: 52
End: 2022-07-19

## 2022-07-19 NOTE — TELEPHONE ENCOUNTER
Reason for Disposition   [1] Follow-up call to recent contact AND [2] information only call, no triage required    Answer Assessment - Initial Assessment Questions  1  REASON FOR CALL or QUESTION: "What is your reason for calling today?" or "How can I best help you?" or "What question do you have that I can help answer?"      Can someone call me from the office to discuss my lab results      Protocols used: INFORMATION ONLY CALL - NO TRIAGE-[facility]

## 2022-07-20 NOTE — TELEPHONE ENCOUNTER
Can you review the patient's chart/labs and see if anything is abnormal?  He has been calling daily for his results

## 2022-07-20 NOTE — TELEPHONE ENCOUNTER
Pt aware- ov scheduled with PM 07/21/22-  Will try to add Rhuematoid Factor tp blood at [facility]

## 2022-07-21 ENCOUNTER — OFFICE VISIT (OUTPATIENT)
Dept: FAMILY MEDICINE CLINIC | Facility: CLINIC | Age: 52
End: 2022-07-21
Payer: COMMERCIAL

## 2022-07-21 VITALS
OXYGEN SATURATION: 97 % | SYSTOLIC BLOOD PRESSURE: 126 MMHG | WEIGHT: 232 LBS | TEMPERATURE: 98.5 F | DIASTOLIC BLOOD PRESSURE: 88 MMHG | HEIGHT: 71 IN | HEART RATE: 93 BPM | BODY MASS INDEX: 32.48 KG/M2

## 2022-07-21 DIAGNOSIS — R79.82 ELEVATED C-REACTIVE PROTEIN (CRP): ICD-10-CM

## 2022-07-21 DIAGNOSIS — M79.10 MYALGIA: ICD-10-CM

## 2022-07-21 DIAGNOSIS — M25.50 ARTHRALGIA, UNSPECIFIED JOINT: ICD-10-CM

## 2022-07-21 DIAGNOSIS — M25.50 ARTHRALGIA, UNSPECIFIED JOINT: Primary | ICD-10-CM

## 2022-07-21 DIAGNOSIS — Z13.6 SCREENING FOR CARDIOVASCULAR CONDITION: ICD-10-CM

## 2022-07-21 PROCEDURE — 3725F SCREEN DEPRESSION PERFORMED: CPT | Performed by: FAMILY MEDICINE

## 2022-07-21 PROCEDURE — 99214 OFFICE O/P EST MOD 30 MIN: CPT | Performed by: FAMILY MEDICINE

## 2022-07-21 RX ORDER — DOXYCYCLINE HYCLATE 100 MG/1
100 CAPSULE ORAL EVERY 12 HOURS SCHEDULED
Qty: 42 CAPSULE | Refills: 0 | Status: SHIPPED | OUTPATIENT
Start: 2022-07-21 | End: 2022-08-11

## 2022-07-21 RX ORDER — DOXYCYCLINE HYCLATE 100 MG
100 TABLET ORAL 2 TIMES DAILY
Qty: 42 TABLET | Refills: 0 | Status: SHIPPED | OUTPATIENT
Start: 2022-07-21 | End: 2022-07-21

## 2022-07-21 RX ORDER — PREDNISONE 20 MG/1
TABLET ORAL
Qty: 15 TABLET | Refills: 0 | Status: SHIPPED | OUTPATIENT
Start: 2022-07-21 | End: 2022-08-03 | Stop reason: SDUPTHER

## 2022-07-21 RX ORDER — PREDNISONE 20 MG/1
TABLET ORAL
Qty: 15 TABLET | Refills: 0 | Status: SHIPPED | OUTPATIENT
Start: 2022-07-21 | End: 2022-07-21

## 2022-07-21 NOTE — PATIENT INSTRUCTIONS
Start prednisone and doxycycline as ordered  Will wait for the rheumatoid factor and CCP which were apparently added yesterday to return  Repeat labs as ordered today after August 3, 2022

## 2022-07-21 NOTE — PROGRESS NOTES
[id number]         NAME: Tg Gonzalez is a 46 y o  male  : 1970    MRN: [id number]  DATE: 2022  TIME: 11:55 AM    Assessment and Plan   Arthralgia, unspecified joint [M25 50]  1  Arthralgia, unspecified joint  Lyme Antibody Profile with reflex to WB    C-reactive protein    Sedimentation rate, automated    predniSONE 20 mg tablet    doxycycline hyclate (VIBRA-TABS) 100 mg tablet    Lyme Antibody Profile with reflex to WB    C-reactive protein    Sedimentation rate, automated   2  Myalgia  predniSONE 20 mg tablet   3  Elevated C-reactive protein (CRP)  C-reactive protein    Sedimentation rate, automated    predniSONE 20 mg tablet    C-reactive protein    Sedimentation rate, automated   4  Screening for cardiovascular condition  Lipid Panel with Direct LDL reflex    Lipid Panel with Direct LDL reflex       No problem-specific Assessment & Plan notes found for this encounter  Patient Instructions     Patient Instructions   Start prednisone and doxycycline as ordered  Will wait for the rheumatoid factor and CCP which were apparently added yesterday to return  Repeat labs as ordered today after August 3, 2022  Chief Complaint     Chief Complaint   Patient presents with    Physical Exam     Discuss labs         History of Present Illness       Patient here for follow-up on recent laboratory studies  Had been evaluated for acute onset muscle aches and arthralgias  Patient states his joint aches are improving however still has significant muscle aches particularly in the calves  Energy level also has improved  Laboratory studies shows only significant abnormalities elevated CRP  Muscle enzymes in rheumatoid studies initially were negative  Lyme titer also negative by Western blot  Review of Systems   Review of Systems   Constitutional: Positive for activity change and fatigue  Negative for appetite change, diaphoresis and fever     HENT: Negative for congestion, ear pain, facial swelling and sinus pressure  Respiratory: Negative for cough, shortness of breath and wheezing  Cardiovascular: Positive for leg swelling  Negative for chest pain and palpitations  Musculoskeletal: Positive for arthralgias and myalgias  Current Medications       Current Outpatient Medications:     Biotin 10 MG TABS, Take by mouth, Disp: , Rfl:     cholecalciferol (VITAMIN D3) 400 units tablet, Take 400 Units by mouth daily, Disp: , Rfl:     clotrimazole-betamethasone (LOTRISONE) 1-0 05 % cream, Apply 1 application topically 2 (two) times a day To affected area, Disp: 135 g, Rfl: 1    Cyanocobalamin (VITAMIN B 12 PO), Take by mouth, Disp: , Rfl:     cyclobenzaprine (FLEXERIL) 10 mg tablet, Take 1 tablet (10 mg total) by mouth 3 (three) times a day as needed for muscle spasms, Disp: 90 tablet, Rfl: 3    doxycycline hyclate (VIBRA-TABS) 100 mg tablet, Take 1 tablet (100 mg total) by mouth 2 (two) times a day for 21 days, Disp: 42 tablet, Rfl: 0    finasteride (PROSCAR) 5 mg tablet, TAKE 1 TABLET DAILY, Disp: 90 tablet, Rfl: 3    Glucosamine-Chondroitin (GLUCOSAMINE CHONDR COMPLEX PO), Take by mouth, Disp: , Rfl:     Multiple Vitamin (MULTIVITAMIN) capsule, Take 1 capsule by mouth daily, Disp: , Rfl:     predniSONE 20 mg tablet, 1 tab twice daily for 5 days, then 1 tab daily for 5 days  , Disp: 15 tablet, Rfl: 0    sildenafil (VIAGRA) 50 MG tablet, Take 2 tablets (100 mg total) by mouth as needed for erectile dysfunction, Disp: 21 tablet, Rfl: 3    tamsulosin (FLOMAX) 0 4 mg, TAKE 1 CAPSULE DAILY WITH DINNER, Disp: 90 capsule, Rfl: 3    Current Allergies     Allergies as of 07/21/2022    (No Known Allergies)            The following portions of the patient's history were reviewed and updated as appropriate: allergies, current medications, past family history, past medical history, past social history, past surgical history and problem list      Past Medical History:   Diagnosis Date    Enlarged prostate    Parminder Pert H/O vasectomy     Inguinal hernia, left 02/19/2018    CLEMENTINE AIKEN MD       Past Surgical History:   Procedure Laterality Date    APPENDECTOMY      CHOLECYSTECTOMY      GASTRECTOMY      gastric bypass    [facility]  2012 S  [facility]   HAND SURGERY      KNEE ARTHROSCOPY      HI REPAIR ING HERNIA,5+Y/O,REDUCIBL Left 2/27/2018    Procedure: REPAIR HERNIA INGUINAL WITH MESH;  Surgeon: Dimas Humphries MD;  Location:  MAIN OR;  Service: General       Family History   Problem Relation Age of Onset    Cancer Mother     Hypertension Mother     Thyroid disease Mother     Hypothyroidism Mother     Diabetes Father         TYPE 2, CONTROLLED    Hypertension Father     Prostate cancer Father     COPD Sister     Mental illness Neg Hx     Substance Abuse Neg Hx          Medications have been verified  Objective   /88 (BP Location: Left arm, Patient Position: Sitting, Cuff Size: Adult)   Pulse 93   Temp 98 5 °F (36 9 °C) (Oral)   Ht 5' 11" (1 803 m)   Wt 105 kg (232 lb)   SpO2 97%   BMI 32 36 kg/m²        Physical Exam     Physical Exam  Vitals reviewed  Constitutional:       General: He is not in acute distress  Appearance: Normal appearance  He is not ill-appearing  HENT:      Head: Normocephalic and atraumatic  Eyes:      Extraocular Movements: Extraocular movements intact  Pupils: Pupils are equal, round, and reactive to light  Cardiovascular:      Rate and Rhythm: Normal rate and regular rhythm  Heart sounds: Normal heart sounds  Pulmonary:      Effort: Pulmonary effort is normal       Breath sounds: Normal breath sounds  Musculoskeletal:      Right lower leg: No edema  Left lower leg: No edema  Comments: Calves bilaterally are tender with mild swelling  No other significant joint tenderness at this point  Skin:     Findings: No erythema or rash  Neurological:      Mental Status: He is alert

## 2022-07-22 RX ORDER — PREDNISONE 20 MG/1
TABLET ORAL
Qty: 15 TABLET | Refills: 0 | OUTPATIENT
Start: 2022-07-22

## 2022-08-01 ENCOUNTER — TELEPHONE (OUTPATIENT)
Dept: FAMILY MEDICINE CLINIC | Facility: CLINIC | Age: 52
End: 2022-08-01

## 2022-08-01 NOTE — TELEPHONE ENCOUNTER
Patient is having reoccurring pain that he originally came in for on 7/21 which include the cramping pain behind the calf and upper left thigh pain  He's also experiencing heart burn on and off which the prednisone didn't work for but it did work for the cramping for the 10 day prescription  Patient has taken tylenol but the prednisone has been the most effective and he is still taking the doxycycline  Patient is still going to repeat labs this week and his wife wanted to mention if we can add the RA factor test to the lab work again  Overall they wanted to know how to go about the pain that he came in for, if they should wait for the labs or if Dr Watters or Lauren Godoy can recommend something for them to do since this pain doesn't seem to go away

## 2022-08-02 DIAGNOSIS — R79.82 ELEVATED C-REACTIVE PROTEIN (CRP): ICD-10-CM

## 2022-08-02 DIAGNOSIS — F51.01 PRIMARY INSOMNIA: ICD-10-CM

## 2022-08-02 DIAGNOSIS — M25.50 ARTHRALGIA, UNSPECIFIED JOINT: Primary | ICD-10-CM

## 2022-08-02 NOTE — TELEPHONE ENCOUNTER
Pt called back in and is aware  Pt states symptoms came back 2-3 days after the 10 day course of prednisone  He claims his pains came back not as severe as they originally were but last night he had knee pain all night which was keeping him up  Friday, Saturday and Monday he was experiencing heart burn but the rest of the other days he wasn't so it comes and goes when it wants to  Patient said yes to more prednisone if the doctors believe it'll help and he asked if we can text him the "[facility]ole [facility]mg [facility] the [facility]" [facility] because he was driving and couldn't note it down

## 2022-08-03 DIAGNOSIS — M79.10 MYALGIA: ICD-10-CM

## 2022-08-03 DIAGNOSIS — K21.9 GASTROESOPHAGEAL REFLUX DISEASE WITHOUT ESOPHAGITIS: Primary | ICD-10-CM

## 2022-08-03 DIAGNOSIS — R79.82 ELEVATED C-REACTIVE PROTEIN (CRP): ICD-10-CM

## 2022-08-03 DIAGNOSIS — M25.50 ARTHRALGIA, UNSPECIFIED JOINT: ICD-10-CM

## 2022-08-03 RX ORDER — PREDNISONE 20 MG/1
TABLET ORAL
Qty: 15 TABLET | Refills: 1 | Status: SHIPPED | OUTPATIENT
Start: 2022-08-03 | End: 2022-09-06

## 2022-08-03 RX ORDER — PANTOPRAZOLE SODIUM 20 MG/1
20 TABLET, DELAYED RELEASE ORAL
Qty: 30 TABLET | Refills: 5 | Status: SHIPPED | OUTPATIENT
Start: 2022-08-03 | End: 2022-09-06 | Stop reason: ALTCHOICE

## 2022-08-03 RX ORDER — CYCLOBENZAPRINE HCL 10 MG
10 TABLET ORAL 3 TIMES DAILY PRN
Qty: 90 TABLET | Refills: 3 | Status: SHIPPED | OUTPATIENT
Start: 2022-08-03

## 2022-08-11 LAB — RHEUMATOID FACT SERPL-ACNC: 12.1 IU/ML

## 2022-08-13 ENCOUNTER — TELEPHONE (OUTPATIENT)
Dept: FAMILY MEDICINE CLINIC | Facility: CLINIC | Age: 52
End: 2022-08-13

## 2022-08-13 NOTE — TELEPHONE ENCOUNTER
Patient has developed a sore throat yesterday 8/12 mid morning and he finished his round of medications that he was told to take during that time and now he's gained a sore throat afterwards  Mucus is clear and wants to know what we can recommend or prescribe  Back of throat isn't spotting but wife noticed little blisters  [phone]

## 2022-08-13 NOTE — TELEPHONE ENCOUNTER
Attempted to call patient back- no answer  Detailed msg left to treat sore throat with comfort measures; salt water gargles, throat lozenges, clear liquids, ect  If develops high fevers, new or worsened sxs, ect then care at urgent care or can schedule ov here next week to follow up

## 2022-08-22 ENCOUNTER — TELEPHONE (OUTPATIENT)
Dept: FAMILY MEDICINE CLINIC | Facility: CLINIC | Age: 52
End: 2022-08-22

## 2022-08-22 NOTE — TELEPHONE ENCOUNTER
Pt is aware of his lab results  Pt stated that he completed the other active labs on 08/06/2022 but has not heard back  Lipid panel, sed-rate, CRP, Lyme Anitbody Profile still show as Active  Pt asks -  if this is a mistake on the part of the lab, or do these tests take a long time to be resulted? Please advise

## 2022-08-22 NOTE — TELEPHONE ENCOUNTER
----- Message from Chrystal Frankel, MD sent at 8/21/2022 11:22 PM EDT -----  Call patient with lab result-rheumatoid factor is normal range

## 2022-08-23 ENCOUNTER — TELEPHONE (OUTPATIENT)
Dept: FAMILY MEDICINE CLINIC | Facility: CLINIC | Age: 52
End: 2022-08-23

## 2022-08-23 LAB
B BURGDOR IGG PATRN SER IB-IMP: NEGATIVE
B BURGDOR IGM PATRN SER IB-IMP: NEGATIVE
B BURGDOR18KD IGG SER QL IB: ABNORMAL
B BURGDOR23KD IGG SER QL IB: ABNORMAL
B BURGDOR23KD IGM SER QL IB: ABNORMAL
B BURGDOR28KD IGG SER QL IB: ABNORMAL
B BURGDOR30KD IGG SER QL IB: ABNORMAL
B BURGDOR39KD IGG SER QL IB: ABNORMAL
B BURGDOR39KD IGM SER QL IB: ABNORMAL
B BURGDOR41KD IGG SER QL IB: ABNORMAL
B BURGDOR41KD IGM SER QL IB: ABNORMAL
B BURGDOR45KD IGG SER QL IB: ABNORMAL
B BURGDOR58KD IGG SER QL IB: ABNORMAL
B BURGDOR66KD IGG SER QL IB: ABNORMAL
B BURGDOR93KD IGG SER QL IB: PRESENT
CCP IGA+IGG SERPL IA-ACNC: NORMAL UNITS
CHOLEST SERPL-MCNC: 198 MG/DL (ref 100–199)
CRP SERPL-MCNC: 2 MG/L (ref 0–10)
ERYTHROCYTE [SEDIMENTATION RATE] IN BLOOD BY WESTERGREN METHOD: 2 MM/HR (ref 0–30)
HDLC SERPL-MCNC: 65 MG/DL
LDLC SERPL CALC-MCNC: 108 MG/DL (ref 0–99)
LDLC/HDLC SERPL: 1.7 RATIO (ref 0–3.6)
RHEUMATOID FACT SERPL-ACNC: <10 IU/ML
SL AMB VLDL CHOLESTEROL CALC: 25 MG/DL (ref 5–40)
TRIGL SERPL-MCNC: 146 MG/DL (ref 0–149)

## 2022-08-23 NOTE — TELEPHONE ENCOUNTER
left vm for pt to call back to verify-see dr Colby Hwang note      Dr Cheyanne Orlando please review-labs results are in

## 2022-08-23 NOTE — RESULT ENCOUNTER NOTE
Call patient with lab result-inflammatory markers have returned to normal   Lyme titer still negative  Cholesterol slightly improved over last year  What are his current symptoms? Looks like he is due for physical   Not sure why these took so long to return may have been the Lyme titer

## 2022-08-24 NOTE — TELEPHONE ENCOUNTER
----- Message from Humera Gomez MD sent at 8/23/2022 11:03 AM EDT -----  Call patient with lab result-inflammatory markers have returned to normal   Lyme titer still negative  Cholesterol slightly improved over last year  What are his current symptoms? Looks like he is due for physical   Not sure why these took so long to return may have been the Lyme titer 
Pt called back and is aware  He scheduled annual physical with Jina Gudino since he was following up with him when he got sick   As of symptoms, he said he's experiencing none and believes he's back to normal  Patient asked if he needed extra blood work for physical but I did inform him that Jina Gudino didn't mention extra blood work for physical 
Home

## 2022-09-06 ENCOUNTER — OFFICE VISIT (OUTPATIENT)
Dept: FAMILY MEDICINE CLINIC | Facility: CLINIC | Age: 52
End: 2022-09-06
Payer: COMMERCIAL

## 2022-09-06 VITALS
OXYGEN SATURATION: 97 % | WEIGHT: 239 LBS | DIASTOLIC BLOOD PRESSURE: 85 MMHG | HEART RATE: 73 BPM | SYSTOLIC BLOOD PRESSURE: 132 MMHG | HEIGHT: 71 IN | BODY MASS INDEX: 33.46 KG/M2

## 2022-09-06 DIAGNOSIS — R63.5 WEIGHT GAIN: ICD-10-CM

## 2022-09-06 DIAGNOSIS — Z00.00 ANNUAL PHYSICAL EXAM: Primary | ICD-10-CM

## 2022-09-06 PROCEDURE — 99396 PREV VISIT EST AGE 40-64: CPT | Performed by: FAMILY MEDICINE

## 2022-09-06 PROCEDURE — 3725F SCREEN DEPRESSION PERFORMED: CPT | Performed by: FAMILY MEDICINE

## 2022-09-06 NOTE — PROGRESS NOTES
ADULT ANNUAL PHYSICAL  [address]    NAME: Jarrett Hawthorne  AGE: 46 y o  SEX: male  : 1970     DATE: 2022     Assessment and Plan:     Problem List Items Addressed This Visit    None     Visit Diagnoses     Annual physical exam    -  Primary    BMI 33 0-33 9,adult        Weight gain        Relevant Orders    TSH, 3rd generation with Free T4 reflex          Immunizations and preventive care screenings were discussed with patient today  Appropriate education was printed on patient's after visit summary  Counseling:  Alcohol/drug use: discussed moderation in alcohol intake, the recommendations for healthy alcohol use, and avoidance of illicit drug use  Dental Health: discussed importance of regular tooth brushing, flossing, and dental visits  · Exercise: the importance of regular exercise/physical activity was discussed  Recommend exercise 3-5 times per week for at least 30 minutes  BMI Counseling: Body mass index is 33 33 kg/m²  The BMI is above normal  Nutrition recommendations include decreasing portion sizes and moderation in carbohydrate intake  Exercise recommendations include moderate physical activity 150 minutes/week  No pharmacotherapy was ordered  Rationale for BMI follow-up plan is due to patient being overweight or obese  Depression Screening and Follow-up Plan: Patient was screened for depression during today's encounter  They screened negative with a PHQ-2 score of 0  Due to weight gain, will check TSH  Also would increase aerobic exercise along with cutting back on amount of alcohol in general overall calories  No follow-ups on file  Chief Complaint:     Chief Complaint   Patient presents with    Physical Exam      History of Present Illness:     Adult Annual Physical   Patient here for a comprehensive physical exam  The patient reports no problems      Diet and Physical Activity  · Diet/Nutrition: well balanced diet, limited junk food and consuming 3-5 servings of fruits/vegetables daily  · Exercise: no formal exercise  Depression Screening  PHQ-2/9 Depression Screening    Little interest or pleasure in doing things: 0 - not at all  Feeling down, depressed, or hopeless: 0 - not at all  PHQ-2 Score: 0  PHQ-2 Interpretation: Negative depression screen       General Health  · Sleep: sleeps poorly  · Hearing: normal - bilateral   · Vision: wears glasses and readers  · Dental: regular dental visits, brushes teeth once daily and flosses teeth occasionally   Health  · Symptoms include: urinary hesitancy     Review of Systems:     Review of Systems   Constitutional: Negative for activity change, appetite change, diaphoresis and fatigue  Respiratory: Negative for cough, chest tightness, shortness of breath and wheezing  Cardiovascular: Negative for chest pain, palpitations and leg swelling  Fast or slow heart rate   Gastrointestinal: Negative for abdominal pain, blood in stool, constipation, diarrhea, nausea and vomiting  Genitourinary: Negative for difficulty urinating, dysuria and frequency  Musculoskeletal: Positive for arthralgias  Negative for gait problem, joint swelling and myalgias  Neurological: Negative for dizziness, weakness, light-headedness, numbness and headaches  Psychiatric/Behavioral: Negative for agitation, confusion, dysphoric mood and sleep disturbance  The patient is not nervous/anxious  Past Medical History:     Past Medical History:   Diagnosis Date    Enlarged prostate    Jessi Bailey H/O vasectomy     Inguinal hernia, left 02/19/2018    CLEMENTINE AIKEN MD      Past Surgical History:     Past Surgical History:   Procedure Laterality Date    APPENDECTOMY      CHOLECYSTECTOMY      GASTRECTOMY      gastric bypass    [facility]IC [facility]PASS  2012    [facility]      HAND SURGERY      KNEE ARTHROSCOPY      DE REPAIR ING HERNIA,5+Y/O,REDUCIBL Left 2/27/2018    Procedure: REPAIR HERNIA INGUINAL WITH MESH;  Surgeon: Jose Alejandro Robb MD;  Location:  MAIN OR;  Service: General      Family History:     Family History   Problem Relation Age of Onset    Cancer Mother     Hypertension Mother     Thyroid disease Mother     Hypothyroidism Mother     Diabetes Father         TYPE 2, CONTROLLED    Hypertension Father     Prostate cancer Father     COPD Sister     Mental illness Neg Hx     Substance Abuse Neg Hx       Social History:     Social History     Socioeconomic History    Marital status: /Civil Union     Spouse name: Nasreen Tan Number of children: 1    Years of education: None    Highest education level: None   Occupational History    Occupation: [employer]     Employer: [employer]   Tobacco Use    Smoking status: Never Smoker    Smokeless tobacco: Current User     Types: Snuff   Vaping Use    Vaping Use: Never used   Substance and Sexual Activity    Alcohol use: Yes     Comment: occasionally    Drug use: No    Sexual activity: Yes   Other Topics Concern    None   Social History Narrative    None     Social Determinants of Health     Financial Resource Strain: Not on file   Food Insecurity: Not on file   Transportation Needs: Not on file   Physical Activity: Not on file   Stress: Not on file   Social Connections: Not on file   Intimate Partner Violence: Not on file   Housing Stability: Not on file      Current Medications:     Current Outpatient Medications   Medication Sig Dispense Refill    Biotin 10 MG TABS Take by mouth      cholecalciferol (VITAMIN D3) 400 units tablet Take 400 Units by mouth daily      clotrimazole-betamethasone (LOTRISONE) 1-0 05 % cream Apply 1 application topically 2 (two) times a day To affected area 135 g 1    Cyanocobalamin (VITAMIN B 12 PO) Take by mouth      cyclobenzaprine (FLEXERIL) 10 mg tablet Take 1 tablet (10 mg total) by mouth 3 (three) times a day as needed for muscle spasms 90 tablet 3    finasteride (PROSCAR) 5 mg tablet TAKE 1 TABLET DAILY 90 tablet 3    Glucosamine-Chondroitin (GLUCOSAMINE CHONDR COMPLEX PO) Take by mouth      Multiple Vitamin (MULTIVITAMIN) capsule Take 1 capsule by mouth daily      sildenafil (VIAGRA) 50 MG tablet Take 2 tablets (100 mg total) by mouth as needed for erectile dysfunction 21 tablet 3    tamsulosin (FLOMAX) 0 4 mg TAKE 1 CAPSULE DAILY WITH DINNER 90 capsule 3     No current facility-administered medications for this visit  Allergies:     No Known Allergies   Physical Exam:     /85 (BP Location: Left arm, Patient Position: Sitting, Cuff Size: Adult)   Pulse 73   Ht 5' 11" (1 803 m)   Wt 108 kg (239 lb)   SpO2 97%   BMI 33 33 kg/m²     Physical Exam  Vitals and nursing note reviewed  Constitutional:       Appearance: He is well-developed  He is obese  HENT:      Head: Normocephalic and atraumatic  Right Ear: Decreased hearing noted  There is impacted cerumen  Left Ear: Tympanic membrane and ear canal normal       Nose: Nose normal       Mouth/Throat:      Mouth: Mucous membranes are moist    Eyes:      Extraocular Movements: Extraocular movements intact  Conjunctiva/sclera: Conjunctivae normal       Pupils: Pupils are equal, round, and reactive to light  Neck:      Vascular: No carotid bruit  Cardiovascular:      Rate and Rhythm: Normal rate and regular rhythm  Heart sounds: No murmur heard  Pulmonary:      Effort: Pulmonary effort is normal  No respiratory distress  Breath sounds: Normal breath sounds  Abdominal:      General: There is no distension  Palpations: Abdomen is soft  There is no mass  Tenderness: There is no abdominal tenderness  There is no right CVA tenderness, left CVA tenderness or guarding  Musculoskeletal:      Cervical back: Neck supple  Right lower leg: No edema  Left lower leg: No edema  Lymphadenopathy:      Cervical: No cervical adenopathy     Skin: General: Skin is warm and dry  Neurological:      Mental Status: He is alert and oriented to person, place, and time     Psychiatric:         Mood and Affect: Mood normal          Behavior: Behavior normal           Kelley Player, MD  [id number]

## 2022-09-06 NOTE — PATIENT INSTRUCTIONS
Due to weight gain, will check TSH  Also would increase aerobic exercise along with cutting back on amount of alcohol in general overall calories  Wellness Visit for Adults   AMBULATORY CARE:   A wellness visit  is when you see your healthcare provider to get screened for health problems  Your healthcare provider will also give you advice on how to stay healthy  Write down your questions so you remember to ask them  Ask your healthcare provider how often you should have a wellness visit  What happens at a wellness visit:  Your healthcare provider will ask about your health, and your family history of health problems  This includes high blood pressure, heart disease, and cancer  He or she will ask if you have symptoms that concern you, if you smoke, and about your mood  You may also be asked about your intake of medicines, supplements, food, and alcohol  Any of the following may be done: Your weight  will be checked  Your height may also be checked so your body mass index (BMI) can be calculated  Your BMI shows if you are at a healthy weight  Your blood pressure  and heart rate will be checked  Your temperature may also be checked  Blood and urine tests  may be done  Blood tests may be done to check your cholesterol levels  Abnormal cholesterol levels increase your risk for heart disease and stroke  You may also need a blood or urine test to check for diabetes if you are at increased risk  Urine tests may be done to look for signs of an infection or kidney disease  A physical exam  includes checking your heartbeat and lungs with a stethoscope  Your healthcare provider may also check your skin to look for sun damage  Screening tests  may be recommended  A screening test is done to check for diseases that may not cause symptoms  The screening tests you may need depend on your age, gender, family history, and lifestyle habits   For example, colorectal screening may be recommended if you are 50 years old or older  Screening tests you need if you are a woman:   A Pap smear  is used to screen for cervical cancer  Pap smears are usually done every 3 to 5 years depending on your age  You may need them more often if you have had abnormal Pap smear test results in the past  Ask your healthcare provider how often you should have a Pap smear  A mammogram  is an x-ray of your breasts to screen for breast cancer  Experts recommend mammograms every 2 years starting at age 48 years  You may need a mammogram at age 52 years or younger if you have an increased risk for breast cancer  Talk to your healthcare provider about when you should start having mammograms and how often you need them  Vaccines you may need:   Get an influenza vaccine  every year  The influenza vaccine protects you from the flu  Several types of viruses cause the flu  The viruses change over time, so new vaccines are made each year  Get a tetanus-diphtheria (Td) booster vaccine  every 10 years  This vaccine protects you against tetanus and diphtheria  Tetanus is a severe infection that may cause painful muscle spasms and lockjaw  Diphtheria is a severe bacterial infection that causes a thick covering in the back of your mouth and throat  Get a human papillomavirus (HPV) vaccine  if you are female and aged 23 to 32 or male 23 to 24 and never received it  This vaccine protects you from HPV infection  HPV is the most common infection spread by sexual contact  HPV may also cause vaginal, penile, and anal cancers  Get a pneumococcal vaccine  if you are aged 72 years or older  The pneumococcal vaccine is an injection given to protect you from pneumococcal disease  Pneumococcal disease is an infection caused by pneumococcal bacteria  The infection may cause pneumonia, meningitis, or an ear infection  Get a shingles vaccine  if you are 60 or older, even if you have had shingles before   The shingles vaccine is an injection to protect you from the varicella-zoster virus  This is the same virus that causes chickenpox  Shingles is a painful rash that develops in people who had chickenpox or have been exposed to the virus  How to eat healthy:  My Plate is a model for planning healthy meals  It shows the types and amounts of foods that should go on your plate  Fruits and vegetables make up about half of your plate, and grains and protein make up the other half  A serving of dairy is included on the side of your plate  The amount of calories and serving sizes you need depends on your age, gender, weight, and height  Examples of healthy foods are listed below:  Eat a variety of vegetables  such as dark green, red, and orange vegetables  You can also include canned vegetables low in sodium (salt) and frozen vegetables without added butter or sauces  Eat a variety of fresh fruits , canned fruit in 100% juice, frozen fruit, and dried fruit  Include whole grains  At least half of the grains you eat should be whole grains  Examples include whole-wheat bread, wheat pasta, brown rice, and whole-grain cereals such as oatmeal     Eat a variety of protein foods such as seafood (fish and shellfish), lean meat, and poultry without skin (turkey and chicken)  Examples of lean meats include pork leg, shoulder, or tenderloin, and beef round, sirloin, tenderloin, and extra lean ground beef  Other protein foods include eggs and egg substitutes, beans, peas, soy products, nuts, and seeds  Choose low-fat dairy products such as skim or 1% milk or low-fat yogurt, cheese, and cottage cheese  Limit unhealthy fats  such as butter, hard margarine, and shortening  Exercise:  Exercise at least 30 minutes per day on most days of the week  Some examples of exercise include walking, biking, dancing, and swimming  You can also fit in more physical activity by taking the stairs instead of the elevator or parking farther away from stores   Include muscle strengthening activities 2 days each week  Regular exercise provides many health benefits  It helps you manage your weight, and decreases your risk for type 2 diabetes, heart disease, stroke, and high blood pressure  Exercise can also help improve your mood  Ask your healthcare provider about the best exercise plan for you  General health and safety guidelines:   Do not smoke  Nicotine and other chemicals in cigarettes and cigars can cause lung damage  Ask your healthcare provider for information if you currently smoke and need help to quit  E-cigarettes or smokeless tobacco still contain nicotine  Talk to your healthcare provider before you use these products  Limit alcohol  A drink of alcohol is 12 ounces of beer, 5 ounces of wine, or 1½ ounces of liquor  Lose weight, if needed  Being overweight increases your risk of certain health conditions  These include heart disease, high blood pressure, type 2 diabetes, and certain types of cancer  Protect your skin  Do not sunbathe or use tanning beds  Use sunscreen with a SPF 15 or higher  Apply sunscreen at least 15 minutes before you go outside  Reapply sunscreen every 2 hours  Wear protective clothing, hats, and sunglasses when you are outside  Drive safely  Always wear your seatbelt  Make sure everyone in your car wears a seatbelt  A seatbelt can save your life if you are in an accident  Do not use your cell phone when you are driving  This could distract you and cause an accident  Pull over if you need to make a call or send a text message  Practice safe sex  Use latex condoms if are sexually active and have more than one partner  Your healthcare provider may recommend screening tests for sexually transmitted infections (STIs)  Wear helmets, lifejackets, and protective gear  Always wear a helmet when you ride a bike or motorcycle, go skiing, or play sports that could cause a head injury  Wear protective equipment when you play sports   Wear a lifejacket when you are on a boat or doing water sports  © Copyright [employer] 2022 Information is for End User's use only and may not be sold, redistributed or otherwise used for commercial purposes  All illustrations and images included in CareNotes® are the copyrighted property of A D A M , Inc  or Evelyn Guaman  The above information is an  only  It is not intended as medical advice for individual conditions or treatments  Talk to your doctor, nurse or pharmacist before following any medical regimen to see if it is safe and effective for you  Cholesterol and Your Health   AMBULATORY CARE:   Cholesterol  is a waxy, fat-like substance  Your body uses cholesterol to make hormones and new cells, and to protect nerves  Cholesterol is made by your body  It also comes from certain foods you eat, such as meat and dairy products  Your healthcare provider can help you set goals for your cholesterol levels  He or she can help you create a plan to meet your goals  Cholesterol level goals: Your cholesterol level goals depend on your risk for heart disease, your age, and your other health conditions  The following are general guidelines: Total cholesterol  includes low-density lipoprotein (LDL), high-density lipoprotein (HDL), and triglyceride levels  The total cholesterol level should be lower than 200 mg/dL and is best at about 150 mg/dL  LDL cholesterol  is called bad cholesterol  because it forms plaque in your arteries  As plaque builds up, your arteries become narrow, and less blood flows through  When plaque decreases blood flow to your heart, you may have chest pain  If plaque completely blocks an artery that brings blood to your heart, you may have a heart attack  Plaque can break off and form blood clots  Blood clots may block arteries in your brain and cause a stroke  The level should be less than 130 mg/dL and is best at about 100 mg/dL           HDL cholesterol  is called good cholesterol  because it helps remove LDL cholesterol from your arteries  It does this by attaching to LDL cholesterol and carrying it to your liver  Your liver breaks down LDL cholesterol so your body can get rid of it  High levels of HDL cholesterol can help prevent a heart attack and stroke  Low levels of HDL cholesterol can increase your risk for heart disease, heart attack, and stroke  The level should be 60 mg/dL or higher  Triglycerides  are a type of fat that store energy from foods you eat  High levels of triglycerides also cause plaque buildup  This can increase your risk for a heart attack or stroke  If your triglyceride level is high, your LDL cholesterol level may also be high  The level should be less than 150 mg/dL  Any of the following can increase your risk for high cholesterol:   Smoking cigarettes    Being overweight or obese, or not getting enough exercise    Drinking large amounts of alcohol    A medical condition such as hypertension (high blood pressure) or diabetes    Certain genes passed from your parents to you    Age older than 65 years    What you need to know about having your cholesterol levels checked: Adults 21to 39years of age should have their cholesterol levels checked every 4 to 6 years  Adults 45 years or older should have their cholesterol checked every 1 to 2 years  You may need your cholesterol checked more often, or at a younger age, if you have risk factors for heart disease  You may also need to have your cholesterol checked more often if you have other health conditions, such as diabetes  Blood tests are used to check cholesterol levels  Blood tests measure your levels of triglycerides, LDL cholesterol, and HDL cholesterol  How healthy fats affect your cholesterol levels:  Healthy fats, also called unsaturated fats, help lower LDL cholesterol and triglyceride levels   Healthy fats include the following:  Monounsaturated fats  are found in foods such as olive oil, canola oil, avocado, nuts, and olives  Polyunsaturated fats,  such as omega 3 fats, are found in fish, such as salmon, trout, and tuna  They can also be found in plant foods such as flaxseed, walnuts, and soybeans  How unhealthy fats affect your cholesterol levels:  Unhealthy fats increase LDL cholesterol and triglyceride levels  They are found in foods high in cholesterol, saturated fat, and trans fat:  Cholesterol  is found in eggs, dairy, and meat  Saturated fat  is found in butter, cheese, ice cream, whole milk, and coconut oil  Saturated fat is also found in meat, such as sausage, hot dogs, and bologna  Trans fat  is found in liquid oils and is used in fried and baked foods  Foods that contain trans fats include chips, crackers, muffins, sweet rolls, microwave popcorn, and cookies  Treatment  for high cholesterol will also decrease your risk of heart disease, heart attack, and stroke  Treatment may include any of the following:  Lifestyle changes  may include food, exercise, weight loss, and quitting smoking  You may also need to decrease the amount of alcohol you drink  Your healthcare provider will want you to start with lifestyle changes  Other treatment may be added if lifestyle changes are not enough  Your healthcare provider may recommend you work with a team to manage hyperlipidemia  The team may include medical experts such as a dietitian, an exercise or physical therapist, and a behavior therapist  Your family members may be included in helping you create lifestyle changes  Medicines  may be given to lower your LDL cholesterol, triglyceride levels, or total cholesterol level  You may need medicines to lower your cholesterol if any of the following is true:    You have a history of stroke, TIA, unstable angina, or a heart attack  Your LDL cholesterol level is 190 mg/dL or higher      You are age 36 to 76 years, have diabetes or heart disease risk factors, and your LDL cholesterol is 70 mg/dL or higher  Supplements  include fish oil, red yeast rice, and garlic  Fish oil may help lower your triglyceride and LDL cholesterol levels  It may also increase your HDL cholesterol level  Red yeast rice may help decrease your total cholesterol level and LDL cholesterol level  Garlic may help lower your total cholesterol level  Do not take any supplements without talking to your healthcare provider  Food changes you can make to lower your cholesterol levels:  A dietitian can help you create a healthy eating plan  He or she can show you how to read food labels and choose foods low in saturated fat, trans fats, and cholesterol  Decrease the total amount of fat you eat  Choose lean meats, fat-free or 1% fat milk, and low-fat dairy products, such as yogurt and cheese  Try to limit or avoid red meats  Limit or do not eat fried foods or baked goods, such as cookies  Replace unhealthy fats with healthy fats  Cook foods in olive oil or canola oil  Choose soft margarines that are low in saturated fat and trans fat  Seeds, nuts, and avocados are other examples of healthy fats  Eat foods with omega-3 fats  Examples include salmon, tuna, mackerel, walnuts, and flaxseed  Eat fish 2 times per week  Pregnant women should not eat fish that have high levels of mercury, such as shark, swordfish, and david mackerel  Increase the amount of high-fiber foods you eat  High-fiber foods can help lower your LDL cholesterol  Aim to get between 20 and 30 grams of fiber each day  Fruits and vegetables are high in fiber  Eat at least 5 servings each day  Other high-fiber foods are whole-grain or whole-wheat breads, pastas, or cereals, and brown rice  Eat 3 ounces of whole-grain foods each day  Increase fiber slowly  You may have abdominal discomfort, bloating, and gas if you add fiber to your diet too quickly  Eat healthy protein foods    Examples include low-fat dairy products, skinless chicken and turkey, fish, and nuts  Limit foods and drinks that are high in sugar  Your dietitian or healthcare provider can help you create daily limits for high-sugar foods and drinks  The limit may be lower if you have diabetes or another health condition  Limits can also help you lose weight if needed  Lifestyle changes you can make to lower your cholesterol levels:   Maintain a healthy weight  Ask your healthcare provider what a healthy weight is for you  Ask him or her to help you create a weight loss plan if needed  Weight loss can decrease your total cholesterol and triglyceride levels  Weight loss may also help keep your blood pressure at a healthy level  Be physically active throughout the day  Physical activity, such as exercise, can help lower your total cholesterol level and maintain a healthy weight  Physical activity can also help increase your HDL cholesterol level  Work with your healthcare provider to create an program that is right for you  Get at least 30 to 40 minutes of moderate physical activity most days of the week  Examples of exercise include brisk walking, swimming, or biking  Also include strength training at least 2 times each week  Your healthcare providers can help you create a physical activity plan  Do not smoke  Nicotine and other chemicals in cigarettes and cigars can raise your cholesterol levels  Ask your healthcare provider for information if you currently smoke and need help to quit  E-cigarettes or smokeless tobacco still contain nicotine  Talk to your healthcare provider before you use these products  Limit or do not drink alcohol  Alcohol can increase your triglyceride levels  Ask your healthcare provider before you drink alcohol  Ask how much is okay for you to drink in 24 hours or 1 week  Follow up with your doctor as directed:  Write down your questions so you remember to ask them during your visits    © Copyright [employer] 2022 Information is for End User's use only and may not be sold, redistributed or otherwise used for commercial purposes  All illustrations and images included in CareNotes® are the copyrighted property of A D A M , Inc  or Evelyn Guaman  The above information is an  only  It is not intended as medical advice for individual conditions or treatments  Talk to your doctor, nurse or pharmacist before following any medical regimen to see if it is safe and effective for you

## 2022-09-11 LAB — CCP IGA+IGG SERPL IA-ACNC: <20 UNITS

## 2022-09-12 ENCOUNTER — TELEPHONE (OUTPATIENT)
Dept: FAMILY MEDICINE CLINIC | Facility: CLINIC | Age: 52
End: 2022-09-12

## 2022-09-12 NOTE — TELEPHONE ENCOUNTER
----- Message from Shanna Griffith MD sent at 9/12/2022  7:47 AM EDT -----  Call patient with lab result-normal- suggests no rheumatoid disease- he is feeling better per last OV

## 2022-09-12 NOTE — RESULT ENCOUNTER NOTE
Call patient with lab result-normal- suggests no rheumatoid disease- he is feeling better per last OV

## 2022-10-12 ENCOUNTER — TELEPHONE (OUTPATIENT)
Dept: FAMILY MEDICINE CLINIC | Facility: CLINIC | Age: 52
End: 2022-10-12

## 2022-10-12 NOTE — TELEPHONE ENCOUNTER
Confirmation Cam Finder [id number]  Effective: 10/12/2022     Expires: 01/10/2023  Active  Referred From  [address] [place] [address]  Group TPD: [id number]  Provider EX: [id number]  Tax R  [address] [facility] [address]  [place] [address]  Referred To  [address]  Specialty: Not Available  Tier 2  Group CMC: [id number]  Provider HY: [id number]  Tax T  This referral is valid at any location for the above group    Patient Info  Jewell Billing  [id number]  Male  1970  [address]  [address]  [place] [address]  Clinical Information  Place of Service  Office  Service Type  Medical Care  Diagnoses  6 Y40 46 - low back pain, unspecified  2 R68 89 - other general symptoms and signs  Procedures  9 63094 - unlisted evaluation and management service

## 2022-11-02 DIAGNOSIS — F51.01 PRIMARY INSOMNIA: ICD-10-CM

## 2022-11-04 RX ORDER — CYCLOBENZAPRINE HCL 10 MG
10 TABLET ORAL 3 TIMES DAILY PRN
Qty: 90 TABLET | Refills: 0 | Status: SHIPPED | OUTPATIENT
Start: 2022-11-04

## 2022-12-14 DIAGNOSIS — N40.1 BENIGN PROSTATIC HYPERPLASIA WITH INCOMPLETE BLADDER EMPTYING: ICD-10-CM

## 2022-12-14 DIAGNOSIS — R39.14 BENIGN PROSTATIC HYPERPLASIA WITH INCOMPLETE BLADDER EMPTYING: ICD-10-CM

## 2022-12-14 RX ORDER — FINASTERIDE 5 MG/1
TABLET, FILM COATED ORAL
Qty: 90 TABLET | Refills: 3 | Status: SHIPPED | OUTPATIENT
Start: 2022-12-14

## 2022-12-14 RX ORDER — TAMSULOSIN HYDROCHLORIDE 0.4 MG/1
CAPSULE ORAL
Qty: 90 CAPSULE | Refills: 3 | Status: SHIPPED | OUTPATIENT
Start: 2022-12-14

## 2023-06-11 ENCOUNTER — TELEPHONE (OUTPATIENT)
Dept: OTHER | Facility: OTHER | Age: 53
End: 2023-06-11

## 2023-06-12 NOTE — TELEPHONE ENCOUNTER
Submitted:   Confirmation - Referral [id number]  Effective: 06/12/2023     Expires: 09/10/2023  Active  Referred From  [facility]  Specialty: Family Practice  Group NPI: [id number]  Provider ID: [id number]  Tax ID: [id number]  Orionjennifer Gibson, [address]  Referred To  [address]  Specialty:  Tier 2  Group NPI: [id number]  Provider ID: [id number]  Tax ID: [id number]  This referral is valid at any location for the above group  Patient Info  Pete Quigley  [id number]  Male  1970  [address]  [place], [place] [address]  Clinical Information  Place of Service  Office  Service Type  Medical Care  Diagnosis  4Y51 59 - other general symptoms and signs  Procedures  [id number] - unlisted evaluation and management service  Disclaimer  [facility] ([facility]) will pay for only those services covered under the [facility] Contract which are specifically noted and requested by the PCP or OBGYN on the referral  If any additional services, testing, or follow-up care are required, the PCP or OBGYN must be contacted prior to the delivery of such additional services for written approval on a separate referral  Non-referred services will not be covered by [facility]  Benefits are underwritten or administered by [employer], a subsidiary of [employer], which are independent licensees of the [employer] and [employer]

## 2023-07-26 NOTE — ASSESSMENT & PLAN NOTE
Patient spouse requesting MRI order states she's scheduled to see an ortho. She took the her dog to the vet, dog ran away at the moment she caught up to dog she was caught in between, her knee popped and now she is unable to walk, and is in pain. informed patient to allow up to 24 to 48 Business hours for a call back with a status. We discussed prostate cancer screening and PSA at depth  We discussed that the only way to tell whether he has prostate cancer or not is with biopsy  We discussed the procedure of a prostate biopsy including the risks and benefits  We discussed options including proceeding with biopsy or continue to observe the PSA  The patient would like to continue to observe the PSA  Given the large size of his prostate as the likely cause of his high PSA I think this is reasonable and we will repeat this in 3 months

## 2023-08-04 ENCOUNTER — TELEPHONE (OUTPATIENT)
Dept: UROLOGY | Facility: AMBULATORY SURGERY CENTER | Age: 53
End: 2023-08-04

## 2023-08-04 ENCOUNTER — OFFICE VISIT (OUTPATIENT)
Dept: FAMILY MEDICINE CLINIC | Facility: CLINIC | Age: 53
End: 2023-08-04
Payer: COMMERCIAL

## 2023-08-04 VITALS
DIASTOLIC BLOOD PRESSURE: 85 MMHG | HEART RATE: 88 BPM | SYSTOLIC BLOOD PRESSURE: 124 MMHG | TEMPERATURE: 96.7 F | BODY MASS INDEX: 32.08 KG/M2 | OXYGEN SATURATION: 98 % | WEIGHT: 230 LBS

## 2023-08-04 DIAGNOSIS — R07.9 CHEST PAIN, UNSPECIFIED TYPE: Primary | ICD-10-CM

## 2023-08-04 DIAGNOSIS — E78.2 MIXED HYPERLIPIDEMIA: ICD-10-CM

## 2023-08-04 DIAGNOSIS — Z98.84 HISTORY OF GASTRIC BYPASS: ICD-10-CM

## 2023-08-04 DIAGNOSIS — R97.20 ELEVATED PROSTATE SPECIFIC ANTIGEN (PSA): Primary | ICD-10-CM

## 2023-08-04 DIAGNOSIS — I49.1 PAC (PREMATURE ATRIAL CONTRACTION): ICD-10-CM

## 2023-08-04 PROCEDURE — 99214 OFFICE O/P EST MOD 30 MIN: CPT | Performed by: NURSE PRACTITIONER

## 2023-08-04 PROCEDURE — 93000 ELECTROCARDIOGRAM COMPLETE: CPT | Performed by: NURSE PRACTITIONER

## 2023-08-04 NOTE — TELEPHONE ENCOUNTER
Pt wife called to schedule annual appt and pt will need PSA script placed in chart     Pt call back-[phone]

## 2023-08-04 NOTE — PROGRESS NOTES
[facility] Medical        NAME: Andrea Mead is a 48 y.o. male  : 1970    MRN: [id number]  DATE: 2023  TIME: 5:56 PM    Assessment and Plan   Chest pain, unspecified type [R07.9]  1. Chest pain, unspecified type  POCT ECG      2. History of gastric bypass  Comprehensive metabolic panel    Vitamin B12    CBC and differential    Iron    Vitamin D Panel    Folate    Vitamin B1, whole blood    Comprehensive metabolic panel    Vitamin B12    CBC and differential    Iron    Vitamin D Panel    Folate    Vitamin B1, whole blood      3. Mixed hyperlipidemia  Lipid panel    Lipid panel      4. PAC (premature atrial contraction)  Ambulatory Referral to Cardiology            Patient Instructions     Patient Instructions   Increase fluids/keep hydrated  Decrease caffeine, alcohol, and tobacco  Healthy diet/exercise  Labs as ordered  Schedule annual physical    Premature Atrial Contractions   AMBULATORY CARE:   Premature atrial contractions (PACs)  are an interruption in your heart rhythm. PACs happen when your heart gets an early signal to pump. PACs are common and usually have no cause. Your risk is increased by stress, fatigue, caffeine, alcohol, or tobacco. Pregnancy and medical conditions such as heart disease or high blood pressure may also increase your risk. Most people have skipped heartbeats from time to time. Follow up with your healthcare provider so the cause of your [facility] can be diagnosed and treated. Common symptoms include the following:   • Palpitations (fast, forceful heartbeats in an irregular rhythm)    • A missed or skipped heartbeat     • Chest pain or shortness of breath    • Lightheadedness, dizziness, or feeling faint    • Tiredness with exercise or activity    Call 911 for any of the following:   You have any of the following signs of a heart attack:   • Squeezing, pressure, or pain in your chest    • You may  also have any of the following:     ? Discomfort or pain in your back, neck, jaw, stomach, or arm    ? Shortness of breath    ? Nausea or vomiting    ? Lightheadedness or a sudden cold sweat    Contact your healthcare provider if:   • Your symptoms do not go away, or they get worse. • You have questions or concerns about your condition or care. Treatment for PACs  is usually not needed. You may be given medicine to strengthen or regulate your heartbeat. Prevent more PACs:   • Do not have alcohol or caffeine. These can increase your PACs. • Do not smoke. Nicotine and other chemicals in cigarettes and cigars can increase heart problems. Ask your healthcare provider for information if you currently smoke and need help to quit. E-cigarettes or smokeless tobacco still contain nicotine. Talk to your healthcare provider before you use these products. • Exercise as directed. Exercise helps keep your heart healthy. Ask your healthcare provider about the best exercise plan for you. Follow up with your doctor as directed:  Write down your questions so you remember to ask them during your visits. © Copyright [facility] 2022 Information is for End User's use only and may not be sold, redistributed or otherwise used for commercial purposes. The above information is an  only. It is not intended as medical advice for individual conditions or treatments. Talk to your doctor, nurse or pharmacist before following any medical regimen to see if it is safe and effective for you. Chief Complaint     Chief Complaint   Patient presents with   • Chest Pain     For about a week         History of Present Illness       C/o intermittent chest pain, and left arm pain x 1 week. No shortness of breath, no weakness. Review of Systems   Review of Systems   Constitutional: Negative for activity change and fever. Eyes: Negative for visual disturbance. Respiratory: Negative for cough, chest tightness, shortness of breath and wheezing.     Cardiovascular: Positive for chest pain. Negative for palpitations and leg swelling. Skin: Negative for color change and pallor. Neurological: Negative for dizziness, weakness and headaches. Psychiatric/Behavioral: Negative for dysphoric mood.          Current Medications       Current Outpatient Medications:   •  Biotin 10 MG TABS, Take by mouth, Disp: , Rfl:   •  cholecalciferol (VITAMIN D3) 400 units tablet, Take 400 Units by mouth daily, Disp: , Rfl:   •  clotrimazole-betamethasone (LOTRISONE) 1-0.05 % cream, Apply 1 application topically 2 (two) times a day To affected area, Disp: 135 g, Rfl: 1  •  Cyanocobalamin (VITAMIN B 12 PO), Take by mouth, Disp: , Rfl:   •  cyclobenzaprine (FLEXERIL) 10 mg tablet, Take 1 tablet (10 mg total) by mouth 3 (three) times a day as needed for muscle spasms, Disp: 90 tablet, Rfl: 2  •  finasteride (PROSCAR) 5 mg tablet, TAKE 1 TABLET DAILY, Disp: 90 tablet, Rfl: 3  •  Glucosamine-Chondroitin (GLUCOSAMINE CHONDR COMPLEX PO), Take by mouth, Disp: , Rfl:   •  Multiple Vitamin (MULTIVITAMIN) capsule, Take 1 capsule by mouth daily, Disp: , Rfl:   •  sildenafil (VIAGRA) 50 MG tablet, Take 2 tablets (100 mg total) by mouth as needed for erectile dysfunction, Disp: 21 tablet, Rfl: 3  •  tamsulosin (FLOMAX) 0.4 mg, TAKE 1 CAPSULE DAILY WITH DINNER, Disp: 90 capsule, Rfl: 3    Current Allergies     Allergies as of 08/04/2023   • (No Known Allergies)            The following portions of the patient's history were reviewed and updated as appropriate: allergies, current medications, past family history, past medical history, past social history, past surgical history and problem list.     Past Medical History:   Diagnosis Date   • Enlarged prostate    • H/O vasectomy    • Inguinal hernia, left 02/19/2018    CLEMENTINE AIKEN MD       Past Surgical History:   Procedure Laterality Date   • APPENDECTOMY     • CHOLECYSTECTOMY     • GASTRECTOMY      gastric bypass   • GASTRIC BYPASS  2012    [facility]. • HAND SURGERY     • KNEE ARTHROSCOPY     • WI RPR 1ST INGUN HRNA AGE 5 YRS/> REDUCIBLE Left 2/27/2018    Procedure: REPAIR HERNIA INGUINAL WITH MESH;  Surgeon: Courtney Griffith MD;  Location:  MAIN OR;  Service: General       Family History   Problem Relation Age of Onset   • Cancer Mother    • Hypertension Mother    • Thyroid disease Mother    • Hypothyroidism Mother    • Diabetes Father         TYPE 2, CONTROLLED   • Hypertension Father    • Prostate cancer Father    • COPD Sister    • Mental illness Neg Hx    • Substance Abuse Neg Hx          Medications have been verified. Objective   /85 (BP Location: Left arm, Patient Position: Sitting, Cuff Size: Standard)   Pulse 88   Temp (!) 96.7 °F (35.9 °C) (Tympanic)   Wt 104 kg (230 lb)   SpO2 98%   BMI 32.08 kg/m²        Physical Exam     Physical Exam  Vitals and nursing note reviewed. Constitutional:       General: He is not in acute distress. Appearance: He is well-developed. He is not ill-appearing. HENT:      Head: Normocephalic. Eyes:      Extraocular Movements: Extraocular movements intact. Neck:      Thyroid: No thyromegaly. Vascular: No carotid bruit or JVD. Trachea: No tracheal deviation. Cardiovascular:      Rate and Rhythm: Normal rate and regular rhythm. Heart sounds: Normal heart sounds. No murmur heard. No friction rub. Pulmonary:      Effort: Pulmonary effort is normal. No respiratory distress. Breath sounds: Normal breath sounds. No wheezing or rhonchi. Chest:      Chest wall: No tenderness. Musculoskeletal:         General: Normal range of motion. Cervical back: Normal range of motion and neck supple. No rigidity or tenderness. Lymphadenopathy:      Cervical: No cervical adenopathy. Skin:     General: Skin is warm and dry. Coloration: Skin is not pale. Findings: No erythema.    Neurological:      Mental Status: He is alert and oriented to person, place, and time. Motor: No weakness. Gait: Gait normal.   Psychiatric:         Mood and Affect: Mood normal.         Behavior: Behavior normal.         Thought Content:  Thought content normal.         Judgment: Judgment normal.

## 2023-08-04 NOTE — TELEPHONE ENCOUNTER
Called and left message for patient that prescription was placed in his chart. Advised looks like he uses [facility], and will place prescription copy in the mail.

## 2023-08-04 NOTE — PATIENT INSTRUCTIONS
Increase fluids/keep hydrated  Decrease caffeine, alcohol, and tobacco  Healthy diet/exercise  Labs as ordered  Schedule annual physical    Premature Atrial Contractions   AMBULATORY CARE:   Premature atrial contractions (PACs)  are an interruption in your heart rhythm. PACs happen when your heart gets an early signal to pump. PACs are common and usually have no cause. Your risk is increased by stress, fatigue, caffeine, alcohol, or tobacco. Pregnancy and medical conditions such as heart disease or high blood pressure may also increase your risk. Most people have skipped heartbeats from time to time. Follow up with your healthcare provider so the cause of your [facility] can be diagnosed and treated. Common symptoms include the following:   Palpitations (fast, forceful heartbeats in an irregular rhythm)    A missed or skipped heartbeat     Chest pain or shortness of breath    Lightheadedness, dizziness, or feeling faint    Tiredness with exercise or activity    Call 911 for any of the following: You have any of the following signs of a heart attack:   Squeezing, pressure, or pain in your chest    You may  also have any of the following:     Discomfort or pain in your back, neck, jaw, stomach, or arm    Shortness of breath    Nausea or vomiting    Lightheadedness or a sudden cold sweat    Contact your healthcare provider if:   Your symptoms do not go away, or they get worse. You have questions or concerns about your condition or care. Treatment for PACs  is usually not needed. You may be given medicine to strengthen or regulate your heartbeat. Prevent more PACs:   Do not have alcohol or caffeine. These can increase your PACs. Do not smoke. Nicotine and other chemicals in cigarettes and cigars can increase heart problems. Ask your healthcare provider for information if you currently smoke and need help to quit. E-cigarettes or smokeless tobacco still contain nicotine.  Talk to your healthcare provider before you use these products. Exercise as directed. Exercise helps keep your heart healthy. Ask your healthcare provider about the best exercise plan for you. Follow up with your doctor as directed:  Write down your questions so you remember to ask them during your visits. © Copyright [employer] 2022 Information is for End User's use only and may not be sold, redistributed or otherwise used for commercial purposes. The above information is an  only. It is not intended as medical advice for individual conditions or treatments. Talk to your doctor, nurse or pharmacist before following any medical regimen to see if it is safe and effective for you.

## 2023-08-06 LAB
PSA SERPL-MCNC: 1.9 NG/ML (ref 0–4)
TSH SERPL DL<=0.005 MIU/L-ACNC: 2.57 UIU/ML (ref 0.45–4.5)

## 2023-08-09 DIAGNOSIS — I49.1 PAC (PREMATURE ATRIAL CONTRACTION): Primary | ICD-10-CM

## 2023-08-14 LAB
25(OH)D2 SERPL-MCNC: <1 NG/ML
25(OH)D3 SERPL-MCNC: 31 NG/ML
25(OH)D3+25(OH)D2 SERPL-MCNC: 31 NG/ML
ALBUMIN SERPL-MCNC: 4.4 G/DL (ref 3.8–4.9)
ALBUMIN/GLOB SERPL: 2.2 {RATIO} (ref 1.2–2.2)
ALP SERPL-CCNC: 50 IU/L (ref 44–121)
ALT SERPL-CCNC: 21 IU/L (ref 0–44)
AST SERPL-CCNC: 19 IU/L (ref 0–40)
BASOPHILS # BLD AUTO: 0.1 X10E3/UL (ref 0–0.2)
BASOPHILS NFR BLD AUTO: 1 %
BILIRUB SERPL-MCNC: 0.4 MG/DL (ref 0–1.2)
BUN SERPL-MCNC: 15 MG/DL (ref 6–24)
BUN/CREAT SERPL: 17 (ref 9–20)
CALCIUM SERPL-MCNC: 9.1 MG/DL (ref 8.7–10.2)
CHLORIDE SERPL-SCNC: 103 MMOL/L (ref 96–106)
CHOLEST SERPL-MCNC: 202 MG/DL (ref 100–199)
CHOLEST/HDLC SERPL: 3.3 RATIO (ref 0–5)
CO2 SERPL-SCNC: 20 MMOL/L (ref 20–29)
CREAT SERPL-MCNC: 0.9 MG/DL (ref 0.76–1.27)
EGFR: 102 ML/MIN/1.73
EOSINOPHIL # BLD AUTO: 0.2 X10E3/UL (ref 0–0.4)
EOSINOPHIL NFR BLD AUTO: 2 %
ERYTHROCYTE [DISTWIDTH] IN BLOOD BY AUTOMATED COUNT: 12.5 % (ref 11.6–15.4)
FOLATE SERPL-MCNC: >20 NG/ML
GLOBULIN SER-MCNC: 2 G/DL (ref 1.5–4.5)
GLUCOSE SERPL-MCNC: 63 MG/DL (ref 70–99)
HCT VFR BLD AUTO: 43.7 % (ref 37.5–51)
HDLC SERPL-MCNC: 62 MG/DL
HGB BLD-MCNC: 14.2 G/DL (ref 13–17.7)
IMM GRANULOCYTES # BLD: 0 X10E3/UL (ref 0–0.1)
IMM GRANULOCYTES NFR BLD: 0 %
IRON SERPL-MCNC: 121 UG/DL (ref 38–169)
LDLC SERPL CALC-MCNC: 114 MG/DL (ref 0–99)
LYMPHOCYTES # BLD AUTO: 1.5 X10E3/UL (ref 0.7–3.1)
LYMPHOCYTES NFR BLD AUTO: 23 %
MCH RBC QN AUTO: 30.7 PG (ref 26.6–33)
MCHC RBC AUTO-ENTMCNC: 32.5 G/DL (ref 31.5–35.7)
MCV RBC AUTO: 95 FL (ref 79–97)
MONOCYTES # BLD AUTO: 0.5 X10E3/UL (ref 0.1–0.9)
MONOCYTES NFR BLD AUTO: 7 %
NEUTROPHILS # BLD AUTO: 4.4 X10E3/UL (ref 1.4–7)
NEUTROPHILS NFR BLD AUTO: 67 %
PLATELET # BLD AUTO: 263 X10E3/UL (ref 150–450)
POTASSIUM SERPL-SCNC: 4.7 MMOL/L (ref 3.5–5.2)
PROT SERPL-MCNC: 6.4 G/DL (ref 6–8.5)
RBC # BLD AUTO: 4.62 X10E6/UL (ref 4.14–5.8)
SL AMB VLDL CHOLESTEROL CALC: 26 MG/DL (ref 5–40)
SODIUM SERPL-SCNC: 142 MMOL/L (ref 134–144)
TRIGL SERPL-MCNC: 147 MG/DL (ref 0–149)
VIT B1 BLD-SCNC: 136.9 NMOL/L (ref 66.5–200)
VIT B12 SERPL-MCNC: 748 PG/ML (ref 232–1245)
WBC # BLD AUTO: 6.6 X10E3/UL (ref 3.4–10.8)

## 2023-08-15 ENCOUNTER — TELEPHONE (OUTPATIENT)
Dept: FAMILY MEDICINE CLINIC | Facility: CLINIC | Age: 53
End: 2023-08-15

## 2023-08-15 NOTE — TELEPHONE ENCOUNTER
Pt aware and have a appointment in September       ----- Message from [address] [place] [address] sent at 8/15/2023  4:25 PM EDT -----  Labs are stable. F/up with Dr. Lola Hubbard as scheduled next month.

## 2023-08-20 LAB — PSA SERPL-MCNC: 2.1 NG/ML (ref 0–4)

## 2023-09-19 ENCOUNTER — OFFICE VISIT (OUTPATIENT)
Dept: FAMILY MEDICINE CLINIC | Facility: CLINIC | Age: 53
End: 2023-09-19
Payer: COMMERCIAL

## 2023-09-19 VITALS
HEART RATE: 90 BPM | BODY MASS INDEX: 32.79 KG/M2 | SYSTOLIC BLOOD PRESSURE: 122 MMHG | TEMPERATURE: 97.1 F | WEIGHT: 234.2 LBS | DIASTOLIC BLOOD PRESSURE: 80 MMHG | HEIGHT: 71 IN

## 2023-09-19 DIAGNOSIS — N40.0 ENLARGED PROSTATE: ICD-10-CM

## 2023-09-19 DIAGNOSIS — R39.14 BENIGN PROSTATIC HYPERPLASIA WITH INCOMPLETE BLADDER EMPTYING: ICD-10-CM

## 2023-09-19 DIAGNOSIS — Z98.84 STATUS POST GASTRIC BYPASS FOR OBESITY: ICD-10-CM

## 2023-09-19 DIAGNOSIS — E78.2 MIXED HYPERLIPIDEMIA: ICD-10-CM

## 2023-09-19 DIAGNOSIS — Z00.00 WELLNESS EXAMINATION: Primary | ICD-10-CM

## 2023-09-19 DIAGNOSIS — N40.1 BENIGN PROSTATIC HYPERPLASIA WITH INCOMPLETE BLADDER EMPTYING: ICD-10-CM

## 2023-09-19 DIAGNOSIS — F51.01 PRIMARY INSOMNIA: ICD-10-CM

## 2023-09-19 PROCEDURE — 99396 PREV VISIT EST AGE 40-64: CPT | Performed by: FAMILY MEDICINE

## 2023-09-19 PROCEDURE — 99214 OFFICE O/P EST MOD 30 MIN: CPT | Performed by: FAMILY MEDICINE

## 2023-09-19 RX ORDER — CYCLOBENZAPRINE HCL 10 MG
10 TABLET ORAL 3 TIMES DAILY PRN
Qty: 90 TABLET | Refills: 3 | Status: SHIPPED | OUTPATIENT
Start: 2023-09-19

## 2023-09-19 RX ORDER — FINASTERIDE 5 MG/1
5 TABLET, FILM COATED ORAL DAILY
Qty: 90 TABLET | Refills: 3 | Status: SHIPPED | OUTPATIENT
Start: 2023-09-19

## 2023-09-19 RX ORDER — TAMSULOSIN HYDROCHLORIDE 0.4 MG/1
0.4 CAPSULE ORAL
Qty: 90 CAPSULE | Refills: 3 | Status: SHIPPED | OUTPATIENT
Start: 2023-09-19

## 2023-09-19 NOTE — PROGRESS NOTES
HPI:  Linda Paez is a 48 y.o. male here for his yearly health maintenance exam.   Patient Active Problem List   Diagnosis   • S/P inguinal hernia repair   • Vitamin D deficiency   • Vitamin B12 deficiency   • Benign prostatic hyperplasia with lower urinary tract symptoms   • Erectile disorder due to medical condition in male   • Elevated PSA   • Obesity (BMI 30.0-34. 9)   • Status post gastric bypass for obesity   • Mixed hyperlipidemia   • Enlarged prostate     Past Medical History:   Diagnosis Date   • Enlarged prostate    • H/O vasectomy    • Inguinal hernia, left 02/19/2018    CLEMENTINE AIKEN MD       1. Advanced Directive: n     2. Durable Power of  for Healthcare: n     3. Social History:           Drug and alcohol History: n                  4. Immunizations up to date: y                 Lifestyle:                           Healthy Diet:y                          Alcohol Use:n                          Tobacco Use:n                          Regular exercise:y                          Weight concerns:n                               5.  Over the past 2 weeks, how often have you been bothered by the following:              Little interest or pleasure in doing things:n              Felling down, depressed or hopeless:n       Current Outpatient Medications   Medication Sig Dispense Refill   • Biotin 10 MG TABS Take by mouth     • cholecalciferol (VITAMIN D3) 400 units tablet Take 400 Units by mouth daily     • clotrimazole-betamethasone (LOTRISONE) 1-0.05 % cream Apply 1 application topically 2 (two) times a day To affected area 135 g 1   • Cyanocobalamin (VITAMIN B 12 PO) Take by mouth     • cyclobenzaprine (FLEXERIL) 10 mg tablet Take 1 tablet (10 mg total) by mouth 3 (three) times a day as needed for muscle spasms 90 tablet 3   • finasteride (PROSCAR) 5 mg tablet Take 1 tablet (5 mg total) by mouth daily 90 tablet 3   • Glucosamine-Chondroitin (GLUCOSAMINE CHONDR COMPLEX PO) Take by mouth     • Multiple Vitamin (MULTIVITAMIN) capsule Take 1 capsule by mouth daily     • sildenafil (VIAGRA) 50 MG tablet Take 2 tablets (100 mg total) by mouth as needed for erectile dysfunction 21 tablet 3   • tamsulosin (FLOMAX) 0.4 mg Take 1 capsule (0.4 mg total) by mouth daily with dinner 90 capsule 3     No current facility-administered medications for this visit. No Known Allergies  Immunization History   Administered Date(s) Administered   • COVID-19 J&J (Edictive) vaccine 0.5 mL 08/24/2021   • Hep B, adult 10/30/1998, 11/27/1998, 06/17/1999   • INFLUENZA 12/19/2005, 11/19/2007   • Tdap 08/19/2016   • Tuberculin Skin Test-PPD Intradermal 11/10/1998, 06/07/1999       Patient Care Team:  Kenneth Chambers MD as PCP - General (Family Medicine)    Review of Systems   Constitutional: Negative for fatigue, fever and unexpected weight change. HENT: Negative for congestion, sinus pain and sore throat. Eyes: Negative for visual disturbance. Respiratory: Negative for shortness of breath and wheezing. Cardiovascular: Negative for chest pain and palpitations. Gastrointestinal: Negative for abdominal pain, nausea and vomiting. Musculoskeletal: Negative. Negative for arthralgias and myalgias. Neurological: Negative for syncope, weakness and numbness. Psychiatric/Behavioral: Negative. Negative for confusion, dysphoric mood and suicidal ideas. Physical Exam :  Physical Exam  Constitutional:       General: He is not in acute distress. Appearance: He is well-developed. He is not diaphoretic. HENT:      Right Ear: Ear canal normal. Tympanic membrane is not injected. Left Ear: Ear canal normal. Tympanic membrane is not injected. Nose: Nose normal.      Mouth/Throat:      Pharynx: No oropharyngeal exudate. Eyes:      Conjunctiva/sclera: Conjunctivae normal.      Pupils: Pupils are equal, round, and reactive to light. Neck:      Thyroid: No thyromegaly.    Cardiovascular:      Rate and Rhythm: Normal rate and regular rhythm. Heart sounds: Normal heart sounds. No murmur heard. Pulmonary:      Effort: Pulmonary effort is normal. No respiratory distress. Breath sounds: Normal breath sounds. No wheezing. Abdominal:      General: Bowel sounds are normal.      Palpations: Abdomen is soft. There is no hepatomegaly, splenomegaly or mass. Tenderness: There is no abdominal tenderness. Musculoskeletal:         General: No tenderness or deformity. Normal range of motion. Cervical back: Normal range of motion and neck supple. Skin:     General: Skin is warm and dry. Coloration: Skin is not pale. Findings: No rash. Neurological:      Mental Status: He is alert and oriented to person, place, and time. He is not disoriented. Sensory: No sensory deficit. Gait: Gait normal.   Psychiatric:         Speech: Speech normal.         Behavior: Behavior normal.           Assessment and Plan:  1. Wellness examination        2. Mixed hyperlipidemia        3. Status post gastric bypass for obesity        4. Enlarged prostate        5. Benign prostatic hyperplasia with incomplete bladder emptying  finasteride (PROSCAR) 5 mg tablet    tamsulosin (FLOMAX) 0.4 mg      6.  Primary insomnia  cyclobenzaprine (FLEXERIL) 10 mg tablet          Health Maintenance Due   Topic Date Due   • HIV Screening  Never done   • COVID-19 Vaccine (2 - Booster for Dunia series) 10/19/2021   • BMI: Followup Plan  09/06/2023   • Influenza Vaccine (1) 09/01/2023

## 2023-09-19 NOTE — PROGRESS NOTES
Assessment/Plan:    No problem-specific Assessment & Plan notes found for this encounter. Diagnoses and all orders for this visit:    Wellness examination    Mixed hyperlipidemia    Status post gastric bypass for obesity    Enlarged prostate    Benign prostatic hyperplasia with incomplete bladder emptying  -     finasteride (PROSCAR) 5 mg tablet; Take 1 tablet (5 mg total) by mouth daily  -     tamsulosin (FLOMAX) 0.4 mg; Take 1 capsule (0.4 mg total) by mouth daily with dinner    Primary insomnia  -     cyclobenzaprine (FLEXERIL) 10 mg tablet; Take 1 tablet (10 mg total) by mouth 3 (three) times a day as needed for muscle spasms          Subjective:   Chief Complaint   Patient presents with   • Physical Exam        Patient ID: Linda Morocho is a 48 y.o. male. HPI    The following portions of the patient's history were reviewed and updated as appropriate: allergies, current medications, past family history, past medical history, past social history, past surgical history and problem list.    Review of Systems   Constitutional: Negative for appetite change and fatigue. HENT: Negative for ear pain, postnasal drip, sinus pressure and sore throat. Eyes: Negative for redness and visual disturbance. Respiratory: Negative for cough, chest tightness, shortness of breath and wheezing. Cardiovascular: Negative for chest pain, palpitations and leg swelling. Gastrointestinal: Negative for abdominal pain, blood in stool, constipation, diarrhea, nausea and vomiting. Genitourinary: Negative for difficulty urinating, flank pain, hematuria and urgency. Musculoskeletal: Negative for arthralgias, back pain, joint swelling and myalgias. Skin: Negative for rash and wound. No suspicious skin lesions   Neurological: Negative for light-headedness, numbness and headaches. Psychiatric/Behavioral: Negative for confusion, decreased concentration, sleep disturbance and suicidal ideas.  The patient is not nervous/anxious. Objective:  Vitals:    09/19/23 1454   BP: 122/80   BP Location: Left arm   Patient Position: Sitting   Cuff Size: Standard   Pulse: 90   Temp: (!) 97.1 °F (36.2 °C)   TempSrc: Tympanic   Weight: 106 kg (234 lb 3.2 oz)   Height: 5' 11" (1.803 m)      Physical Exam  Constitutional:       General: He is not in acute distress. Appearance: He is well-developed. He is not diaphoretic. HENT:      Right Ear: Ear canal normal. Tympanic membrane is not injected. Left Ear: Ear canal normal. Tympanic membrane is not injected. Nose: Nose normal.      Mouth/Throat:      Pharynx: No oropharyngeal exudate. Eyes:      Conjunctiva/sclera: Conjunctivae normal.      Pupils: Pupils are equal, round, and reactive to light. Neck:      Thyroid: No thyromegaly. Cardiovascular:      Rate and Rhythm: Normal rate and regular rhythm. Heart sounds: Normal heart sounds. No murmur heard. Pulmonary:      Effort: Pulmonary effort is normal. No respiratory distress. Breath sounds: Normal breath sounds. No wheezing. Abdominal:      General: Bowel sounds are normal.      Palpations: Abdomen is soft. There is no hepatomegaly, splenomegaly or mass. Tenderness: There is no abdominal tenderness. Musculoskeletal:         General: No tenderness or deformity. Normal range of motion. Cervical back: Normal range of motion and neck supple. Skin:     General: Skin is warm and dry. Coloration: Skin is not pale. Findings: No rash. Neurological:      Mental Status: He is alert and oriented to person, place, and time. He is not disoriented. Sensory: No sensory deficit.       Gait: Gait normal.   Psychiatric:         Speech: Speech normal.         Behavior: Behavior normal.

## 2023-09-27 ENCOUNTER — TELEPHONE (OUTPATIENT)
Dept: FAMILY MEDICINE CLINIC | Facility: CLINIC | Age: 53
End: 2023-09-27

## 2023-09-27 NOTE — TELEPHONE ENCOUNTER
Referral Request      Confirmation - Referral [id number]  Effective: 09/27/2023     Expires: 12/26/2023  Active  Referred From  [address] [facility]  Specialty: Family Practice  Group NPI: [id number]  Provider ID: [id number]  Tax ID: [id number]  [address]  Referred To  [address] [facility]  Specialty:  Tier 2  Group NPI: [id number]  Provider ID: [id number]  Tax ID: [id number]  Individual Provider NPI: [id number]  Provider Name: Hyun King MD  This referral is valid at any location for the above group  Patient Info  Dock   [id number]  Male  1970 [address] [address]  Clinical Information  Place of Service  Office  Service Type  Medical Care  Diagnosis  3J52.02 - other general symptoms and signs  Procedures  [id number] - unlisted evaluation and management service  Notes  10 visits

## 2024-02-06 NOTE — TELEPHONE ENCOUNTER
Entered referral into [facility] and linked to appointments  Still cannot pull up referrals in [facility] 
Hello,    Patient is scheduled to be seen at [facility]) Urology ([id number]) on 9/13, and is in need of a insurance referral   The diagnosis to be used for the visit is R97 20  The procedure codes to used are [address] [facility], [id number], [id number], and 99571  Thanks for your time 
Referral #: [id number] Effective: 08/29/2019 Expires: 11/26/2019
N/A

## 2024-04-25 ENCOUNTER — TELEPHONE (OUTPATIENT)
Age: 54
End: 2024-04-25

## 2024-04-25 NOTE — TELEPHONE ENCOUNTER
Patient is requesting an insurance referral for the following specialty:      Test Name / Order Name: Chiropractor appointment     DX Code:     Date Of Service:  4/26/2024    Location/Facility Name/Address/Phone #: [facility] [place] [phone]    Location / Facility NPI:  [id number]     [facility] Phone # To Reach The Patient:  [phone]      Patient did not have diagnosis code.

## 2024-04-26 NOTE — TELEPHONE ENCOUNTER
Ghassan called in because he already had his appointment with the Chiropractor today 4/26/24 before the prior auth. It shows that he has to set up an appointment to get the referral. Please call [phone]. He also said that it seems that his old referral was put in incorrectly because he was charged a deductible instead of a co pay that he normally pays. The old referral number is: [id number]

## 2024-04-29 NOTE — TELEPHONE ENCOUNTER
Referral placed and back dates:patient aware-appt cancelled.     : Confrmation - Referral [id number] Effective: 04/26/2024 Expires: 07/25/2024 Referred From [facility] Specialty: Family Practice Group NPI: [id number] Provider ID: [id number] Tax ID: [id number] [address] [address] Referred To [facility] Specialty: Chiropractics Group NPI: [id number] Provider ID: [id number] Tax ID: [id number] Individual Provider NPI: Not Available This referral is valid at any location for the above group Patient Info STEPHY REHMAN [id number] Male 1970 [address] [place] [address] Clinical Information Place of Service Ofce Service Type Medical Care Diagnoses 1 R68.89- other general symptoms and signs 2 M54.50- low back pain, unspecifed Procedures None entered

## 2024-07-16 ENCOUNTER — TELEPHONE (OUTPATIENT)
Dept: FAMILY MEDICINE CLINIC | Facility: CLINIC | Age: 54
End: 2024-07-16

## 2024-07-16 ENCOUNTER — TELEPHONE (OUTPATIENT)
Age: 54
End: 2024-07-16

## 2024-07-16 DIAGNOSIS — M54.50 LOW BACK PAIN, UNSPECIFIED BACK PAIN LATERALITY, UNSPECIFIED CHRONICITY, UNSPECIFIED WHETHER SCIATICA PRESENT: Primary | ICD-10-CM

## 2024-07-16 NOTE — TELEPHONE ENCOUNTER
Patient called for renewal of previous  insurance referral for [facility] in [place]. [phone].  Patient does not have appointment scheduled yet.  NPI# [id number].

## 2024-09-16 ENCOUNTER — TELEPHONE (OUTPATIENT)
Dept: FAMILY MEDICINE CLINIC | Facility: CLINIC | Age: 54
End: 2024-09-16

## 2024-09-16 NOTE — TELEPHONE ENCOUNTER
Left vm for pt. Scheduled for 9/20. Dr. Watters is no longer in the office that day. We need to reschedule with Dr. Watters or that day with another provider.

## 2024-09-24 ENCOUNTER — TELEPHONE (OUTPATIENT)
Dept: FAMILY MEDICINE CLINIC | Facility: CLINIC | Age: 54
End: 2024-09-24

## 2024-09-25 ENCOUNTER — OFFICE VISIT (OUTPATIENT)
Dept: FAMILY MEDICINE CLINIC | Facility: CLINIC | Age: 54
End: 2024-09-25
Payer: COMMERCIAL

## 2024-09-25 VITALS
OXYGEN SATURATION: 96 % | TEMPERATURE: 97 F | WEIGHT: 241 LBS | DIASTOLIC BLOOD PRESSURE: 88 MMHG | SYSTOLIC BLOOD PRESSURE: 138 MMHG | HEIGHT: 71 IN | HEART RATE: 85 BPM | BODY MASS INDEX: 33.74 KG/M2

## 2024-09-25 DIAGNOSIS — R53.83 OTHER FATIGUE: ICD-10-CM

## 2024-09-25 DIAGNOSIS — F51.01 PRIMARY INSOMNIA: ICD-10-CM

## 2024-09-25 DIAGNOSIS — N40.1 BENIGN PROSTATIC HYPERPLASIA WITH INCOMPLETE BLADDER EMPTYING: ICD-10-CM

## 2024-09-25 DIAGNOSIS — E66.01 CLASS 3 OBESITY: ICD-10-CM

## 2024-09-25 DIAGNOSIS — R21 RASH: ICD-10-CM

## 2024-09-25 DIAGNOSIS — R39.14 BENIGN PROSTATIC HYPERPLASIA WITH INCOMPLETE BLADDER EMPTYING: ICD-10-CM

## 2024-09-25 DIAGNOSIS — E78.2 MIXED HYPERLIPIDEMIA: ICD-10-CM

## 2024-09-25 DIAGNOSIS — E66.813 CLASS 3 OBESITY: ICD-10-CM

## 2024-09-25 DIAGNOSIS — Z00.00 WELLNESS EXAMINATION: ICD-10-CM

## 2024-09-25 DIAGNOSIS — R97.20 ELEVATED PSA: Primary | ICD-10-CM

## 2024-09-25 DIAGNOSIS — M25.50 ARTHRALGIA, UNSPECIFIED JOINT: ICD-10-CM

## 2024-09-25 PROCEDURE — 99396 PREV VISIT EST AGE 40-64: CPT | Performed by: FAMILY MEDICINE

## 2024-09-25 PROCEDURE — 99214 OFFICE O/P EST MOD 30 MIN: CPT | Performed by: FAMILY MEDICINE

## 2024-09-25 RX ORDER — CLOTRIMAZOLE AND BETAMETHASONE DIPROPIONATE 10; .64 MG/G; MG/G
1 CREAM TOPICAL 2 TIMES DAILY
Qty: 135 G | Refills: 1 | Status: SHIPPED | OUTPATIENT
Start: 2024-09-25

## 2024-09-25 RX ORDER — CYCLOBENZAPRINE HCL 10 MG
10 TABLET ORAL 3 TIMES DAILY PRN
Qty: 90 TABLET | Refills: 3 | Status: SHIPPED | OUTPATIENT
Start: 2024-09-25

## 2024-09-25 RX ORDER — TAMSULOSIN HYDROCHLORIDE 0.4 MG/1
0.4 CAPSULE ORAL
Qty: 90 CAPSULE | Refills: 3 | Status: SHIPPED | OUTPATIENT
Start: 2024-09-25

## 2024-09-25 RX ORDER — FINASTERIDE 5 MG/1
5 TABLET, FILM COATED ORAL DAILY
Qty: 90 TABLET | Refills: 3 | Status: SHIPPED | OUTPATIENT
Start: 2024-09-25

## 2024-09-25 NOTE — ASSESSMENT & PLAN NOTE
Orders:    finasteride (PROSCAR) 5 mg tablet; Take 1 tablet (5 mg total) by mouth daily    tamsulosin (FLOMAX) 0.4 mg; Take 1 capsule (0.4 mg total) by mouth daily with dinner

## 2024-09-25 NOTE — ASSESSMENT & PLAN NOTE
Orders:    CBC and differential; Future    Comprehensive metabolic panel; Future    Lipid Panel with Direct LDL reflex; Future    PSA Total (Reflex To Free); Future    Vitamin B12; Future    Vitamin D 25 hydroxy; Future    T4, free; Future    TSH, 3rd generation; Future    C-reactive protein; Future    Ferritin; Future    CBC and differential    Comprehensive metabolic panel    Lipid Panel with Direct LDL reflex    PSA Total (Reflex To Free)    Vitamin B12    Vitamin D 25 hydroxy    T4, free    TSH, 3rd generation    C-reactive protein    Ferritin

## 2024-09-25 NOTE — PROGRESS NOTES
Adult Annual Physical  Name: Ghassan Kahn      : 1970      MRN: [id number]  Encounter Provider: Michael Watters MD  Encounter Date: 2024   Encounter department: [facility]    Assessment & Plan  Elevated PSA    Orders:    CBC and differential; Future    Comprehensive metabolic panel; Future    Lipid Panel with Direct LDL reflex; Future    PSA Total (Reflex To Free); Future    Vitamin B12; Future    Vitamin D 25 hydroxy; Future    T4, free; Future    TSH, 3rd generation; Future    C-reactive protein; Future    Ferritin; Future    CBC and differential    Comprehensive metabolic panel    Lipid Panel with Direct LDL reflex    PSA Total (Reflex To Free)    Vitamin B12    Vitamin D 25 hydroxy    T4, free    TSH, 3rd generation    C-reactive protein    Ferritin    Mixed hyperlipidemia    Orders:    CBC and differential; Future    Comprehensive metabolic panel; Future    Lipid Panel with Direct LDL reflex; Future    PSA Total (Reflex To Free); Future    Vitamin B12; Future    Vitamin D 25 hydroxy; Future    T4, free; Future    TSH, 3rd generation; Future    C-reactive protein; Future    Ferritin; Future    CBC and differential    Comprehensive metabolic panel    Lipid Panel with Direct LDL reflex    PSA Total (Reflex To Free)    Vitamin B12    Vitamin D 25 hydroxy    T4, free    TSH, 3rd generation    C-reactive protein    Ferritin    Wellness examination         Other fatigue    Orders:    CBC and differential; Future    Comprehensive metabolic panel; Future    Lipid Panel with Direct LDL reflex; Future    PSA Total (Reflex To Free); Future    Vitamin B12; Future    Vitamin D 25 hydroxy; Future    T4, free; Future    TSH, 3rd generation; Future    C-reactive protein; Future    Ferritin; Future    CBC and differential    Comprehensive metabolic panel    Lipid Panel with Direct LDL reflex    PSA Total (Reflex To Free)    Vitamin B12    Vitamin D 25 hydroxy    T4, free    TSH, 3rd  generation    C-reactive protein    Ferritin    Arthralgia, unspecified joint    Orders:    CBC and differential; Future    Comprehensive metabolic panel; Future    Lipid Panel with Direct LDL reflex; Future    PSA Total (Reflex To Free); Future    Vitamin B12; Future    Vitamin D 25 hydroxy; Future    T4, free; Future    TSH, 3rd generation; Future    C-reactive protein; Future    Ferritin; Future    CBC and differential    Comprehensive metabolic panel    Lipid Panel with Direct LDL reflex    PSA Total (Reflex To Free)    Vitamin B12    Vitamin D 25 hydroxy    T4, free    TSH, 3rd generation    C-reactive protein    Ferritin    Primary insomnia    Orders:    cyclobenzaprine (FLEXERIL) 10 mg tablet; Take 1 tablet (10 mg total) by mouth 3 (three) times a day as needed for muscle spasms    Benign prostatic hyperplasia with incomplete bladder emptying    Orders:    finasteride (PROSCAR) 5 mg tablet; Take 1 tablet (5 mg total) by mouth daily    tamsulosin (FLOMAX) 0.4 mg; Take 1 capsule (0.4 mg total) by mouth daily with dinner    Rash    Orders:    clotrimazole-betamethasone (LOTRISONE) 1-0.05 % cream; Apply 1 Application topically 2 (two) times a day To affected area    Class 3 obesity    Orders:    phentermine (ADIPEX-P) 37.5 MG tablet; Take 1 tablet (37.5 mg total) by mouth in the morning    Immunizations and preventive care screenings were discussed with patient today. Appropriate education was printed on patient's after visit summary.        Counseling:  Alcohol/drug use: discussed moderation in alcohol intake, the recommendations for healthy alcohol use, and avoidance of illicit drug use.      Depression Screening and Follow-up Plan: Patient was screened for depression during today's encounter. They screened negative with a PHQ-2 score of 0.        History of Present Illness     Adult Annual Physical:  Patient presents for annual physical.     Diet and Physical Activity:  - Diet/Nutrition: well balanced diet.  -  "Exercise: moderate cardiovascular exercise and 1-2 times a week on average.    Depression Screening:  - PHQ-2 Score: 0    General Health:  - Sleep: sleeps well and 7-8 hours of sleep on average.  - Hearing: normal hearing right ear.  - Vision: no vision problems.  - Dental: regular dental visits and brushes teeth twice daily.     Health:  - History of STDs: no.   - Urinary symptoms: none.     Advanced Care Planning:  - Has an advanced directive?: no    - Has a durable medical POA?: no    - ACP document given to patient?: no      Review of Systems   Constitutional:  Negative for fatigue, fever and unexpected weight change.   HENT:  Negative for congestion, sinus pain and sore throat.    Eyes:  Negative for visual disturbance.   Respiratory:  Negative for shortness of breath and wheezing.    Cardiovascular:  Negative for chest pain and palpitations.   Gastrointestinal:  Negative for abdominal pain, nausea and vomiting.   Musculoskeletal: Negative.  Negative for arthralgias and myalgias.   Neurological:  Negative for syncope, weakness and numbness.   Psychiatric/Behavioral: Negative.  Negative for confusion, dysphoric mood and suicidal ideas.          Objective     /88 (BP Location: Left arm, Patient Position: Sitting, Cuff Size: Standard)   Pulse 85   Temp (!) 97 °F (36.1 °C) (Tympanic)   Ht 5' 11\" (1.803 m)   Wt 109 kg (241 lb)   SpO2 96%   BMI 33.61 kg/m²     Physical Exam  Neck:      Thyroid: No thyromegaly.   Cardiovascular:      Rate and Rhythm: Normal rate and regular rhythm.   Pulmonary:      Effort: Pulmonary effort is normal.      Breath sounds: Normal breath sounds.   Abdominal:      Palpations: Abdomen is soft. There is no mass.      Tenderness: There is no abdominal tenderness.   Musculoskeletal:         General: Normal range of motion.      Cervical back: Normal range of motion and neck supple.   Lymphadenopathy:      Cervical: No cervical adenopathy.         "

## 2024-09-25 NOTE — PROGRESS NOTES
Assessment/Plan:    No problem-specific Assessment & Plan notes found for this encounter.       Diagnoses and all orders for this visit:    Elevated PSA  -     CBC and differential; Future  -     Comprehensive metabolic panel; Future  -     Lipid Panel with Direct LDL reflex; Future  -     PSA Total (Reflex To Free); Future  -     Vitamin B12; Future  -     Vitamin D 25 hydroxy; Future  -     T4, free; Future  -     TSH, 3rd generation; Future  -     C-reactive protein; Future  -     Ferritin; Future  -     CBC and differential  -     Comprehensive metabolic panel  -     Lipid Panel with Direct LDL reflex  -     PSA Total (Reflex To Free)  -     Vitamin B12  -     Vitamin D 25 hydroxy  -     T4, free  -     TSH, 3rd generation  -     C-reactive protein  -     Ferritin    Mixed hyperlipidemia  -     CBC and differential; Future  -     Comprehensive metabolic panel; Future  -     Lipid Panel with Direct LDL reflex; Future  -     PSA Total (Reflex To Free); Future  -     Vitamin B12; Future  -     Vitamin D 25 hydroxy; Future  -     T4, free; Future  -     TSH, 3rd generation; Future  -     C-reactive protein; Future  -     Ferritin; Future  -     CBC and differential  -     Comprehensive metabolic panel  -     Lipid Panel with Direct LDL reflex  -     PSA Total (Reflex To Free)  -     Vitamin B12  -     Vitamin D 25 hydroxy  -     T4, free  -     TSH, 3rd generation  -     C-reactive protein  -     Ferritin    Wellness examination    Other fatigue  -     CBC and differential; Future  -     Comprehensive metabolic panel; Future  -     Lipid Panel with Direct LDL reflex; Future  -     PSA Total (Reflex To Free); Future  -     Vitamin B12; Future  -     Vitamin D 25 hydroxy; Future  -     T4, free; Future  -     TSH, 3rd generation; Future  -     C-reactive protein; Future  -     Ferritin; Future  -     CBC and differential  -     Comprehensive metabolic panel  -     Lipid Panel with Direct LDL reflex  -     PSA Total  (Reflex To Free)  -     Vitamin B12  -     Vitamin D 25 hydroxy  -     T4, free  -     TSH, 3rd generation  -     C-reactive protein  -     Ferritin    Arthralgia, unspecified joint  -     CBC and differential; Future  -     Comprehensive metabolic panel; Future  -     Lipid Panel with Direct LDL reflex; Future  -     PSA Total (Reflex To Free); Future  -     Vitamin B12; Future  -     Vitamin D 25 hydroxy; Future  -     T4, free; Future  -     TSH, 3rd generation; Future  -     C-reactive protein; Future  -     Ferritin; Future  -     CBC and differential  -     Comprehensive metabolic panel  -     Lipid Panel with Direct LDL reflex  -     PSA Total (Reflex To Free)  -     Vitamin B12  -     Vitamin D 25 hydroxy  -     T4, free  -     TSH, 3rd generation  -     C-reactive protein  -     Ferritin    Primary insomnia  -     cyclobenzaprine (FLEXERIL) 10 mg tablet; Take 1 tablet (10 mg total) by mouth 3 (three) times a day as needed for muscle spasms    Benign prostatic hyperplasia with incomplete bladder emptying  -     finasteride (PROSCAR) 5 mg tablet; Take 1 tablet (5 mg total) by mouth daily  -     tamsulosin (FLOMAX) 0.4 mg; Take 1 capsule (0.4 mg total) by mouth daily with dinner    Rash  -     clotrimazole-betamethasone (LOTRISONE) 1-0.05 % cream; Apply 1 Application topically 2 (two) times a day To affected area          Subjective:   Chief Complaint   Patient presents with    Physical Exam        Patient ID: Ghassan Kahn is a 54 y.o. male.    HPI    The following portions of the patient's history were reviewed and updated as appropriate: allergies, current medications, past family history, past medical history, past social history, past surgical history and problem list.    Review of Systems   Constitutional:  Negative for fatigue, fever and unexpected weight change.   HENT:  Negative for congestion, sinus pain and sore throat.    Eyes:  Negative for visual disturbance.   Respiratory:  Negative for shortness  "of breath and wheezing.    Cardiovascular:  Negative for chest pain and palpitations.   Gastrointestinal:  Negative for abdominal pain, nausea and vomiting.   Musculoskeletal: Negative.  Negative for arthralgias and myalgias.   Neurological:  Negative for syncope, weakness and numbness.   Psychiatric/Behavioral: Negative.  Negative for confusion, dysphoric mood and suicidal ideas.          Objective:  Vitals:    09/25/24 1630   BP: 138/88   BP Location: Left arm   Patient Position: Sitting   Cuff Size: Standard   Pulse: 85   Temp: (!) 97 °F (36.1 °C)   TempSrc: Tympanic   SpO2: 96%   Weight: 109 kg (241 lb)   Height: 5' 11\" (1.803 m)      Physical Exam  Constitutional:       Appearance: He is well-developed.   HENT:      Right Ear: Ear canal normal. Tympanic membrane is not injected.      Left Ear: Ear canal normal. Tympanic membrane is not injected.      Nose: Nose normal.   Eyes:      General:         Right eye: No discharge.         Left eye: No discharge.      Conjunctiva/sclera: Conjunctivae normal.      Pupils: Pupils are equal, round, and reactive to light.   Neck:      Thyroid: No thyromegaly.   Cardiovascular:      Rate and Rhythm: Normal rate and regular rhythm.      Heart sounds: Normal heart sounds. No murmur heard.  Pulmonary:      Effort: Pulmonary effort is normal. No respiratory distress.      Breath sounds: Normal breath sounds. No wheezing.   Abdominal:      General: Bowel sounds are normal. There is no distension.      Palpations: Abdomen is soft.      Tenderness: There is no abdominal tenderness.   Musculoskeletal:         General: Normal range of motion.      Cervical back: Normal range of motion and neck supple.   Lymphadenopathy:      Cervical: No cervical adenopathy.   Skin:     General: Skin is warm and dry.   Neurological:      Mental Status: He is alert and oriented to person, place, and time. He is not disoriented.      Sensory: No sensory deficit.      Motor: No weakness.      " Coordination: Coordination normal.      Gait: Gait normal.      Deep Tendon Reflexes: Reflexes are normal and symmetric.   Psychiatric:         Speech: Speech normal.         Behavior: Behavior normal.         Thought Content: Thought content normal.         Judgment: Judgment normal.

## 2024-09-26 RX ORDER — PHENTERMINE HYDROCHLORIDE 37.5 MG/1
37.5 TABLET ORAL DAILY
Qty: 90 TABLET | Refills: 1 | Status: SHIPPED | OUTPATIENT
Start: 2024-09-26

## 2024-09-28 LAB
25(OH)D3+25(OH)D2 SERPL-MCNC: 28 NG/ML (ref 30–100)
ALBUMIN SERPL-MCNC: 4.4 G/DL (ref 3.8–4.9)
ALP SERPL-CCNC: 53 IU/L (ref 44–121)
ALT SERPL-CCNC: 16 IU/L (ref 0–44)
AST SERPL-CCNC: 20 IU/L (ref 0–40)
BASOPHILS # BLD AUTO: 0.1 X10E3/UL (ref 0–0.2)
BASOPHILS NFR BLD AUTO: 1 %
BILIRUB SERPL-MCNC: 0.4 MG/DL (ref 0–1.2)
BUN SERPL-MCNC: 12 MG/DL (ref 6–24)
BUN/CREAT SERPL: 12 (ref 9–20)
CALCIUM SERPL-MCNC: 9.4 MG/DL (ref 8.7–10.2)
CHLORIDE SERPL-SCNC: 101 MMOL/L (ref 96–106)
CHOLEST SERPL-MCNC: 200 MG/DL (ref 100–199)
CO2 SERPL-SCNC: 23 MMOL/L (ref 20–29)
CREAT SERPL-MCNC: 0.98 MG/DL (ref 0.76–1.27)
CRP SERPL-MCNC: 1 MG/L (ref 0–10)
EGFR: 92 ML/MIN/1.73
EOSINOPHIL # BLD AUTO: 0.1 X10E3/UL (ref 0–0.4)
EOSINOPHIL NFR BLD AUTO: 3 %
ERYTHROCYTE [DISTWIDTH] IN BLOOD BY AUTOMATED COUNT: 12.4 % (ref 11.6–15.4)
FERRITIN SERPL-MCNC: 172 NG/ML (ref 30–400)
GLOBULIN SER-MCNC: 2.4 G/DL (ref 1.5–4.5)
GLUCOSE SERPL-MCNC: 92 MG/DL (ref 70–99)
HCT VFR BLD AUTO: 47 % (ref 37.5–51)
HDLC SERPL-MCNC: 62 MG/DL
HGB BLD-MCNC: 15.7 G/DL (ref 13–17.7)
IMM GRANULOCYTES # BLD: 0 X10E3/UL (ref 0–0.1)
IMM GRANULOCYTES NFR BLD: 0 %
LDLC SERPL CALC-MCNC: 105 MG/DL (ref 0–99)
LDLC/HDLC SERPL: 1.7 RATIO (ref 0–3.6)
LYMPHOCYTES # BLD AUTO: 1.6 X10E3/UL (ref 0.7–3.1)
LYMPHOCYTES NFR BLD AUTO: 31 %
MCH RBC QN AUTO: 31.3 PG (ref 26.6–33)
MCHC RBC AUTO-ENTMCNC: 33.4 G/DL (ref 31.5–35.7)
MCV RBC AUTO: 94 FL (ref 79–97)
MONOCYTES # BLD AUTO: 0.4 X10E3/UL (ref 0.1–0.9)
MONOCYTES NFR BLD AUTO: 8 %
NEUTROPHILS # BLD AUTO: 2.9 X10E3/UL (ref 1.4–7)
NEUTROPHILS NFR BLD AUTO: 57 %
PLATELET # BLD AUTO: 313 X10E3/UL (ref 150–450)
POTASSIUM SERPL-SCNC: 4.8 MMOL/L (ref 3.5–5.2)
PROT SERPL-MCNC: 6.8 G/DL (ref 6–8.5)
PSA SERPL-MCNC: 2.5 NG/ML (ref 0–4)
RBC # BLD AUTO: 5.02 X10E6/UL (ref 4.14–5.8)
SL AMB REFLEX CRITERIA: NORMAL
SL AMB VLDL CHOLESTEROL CALC: 33 MG/DL (ref 5–40)
SODIUM SERPL-SCNC: 139 MMOL/L (ref 134–144)
T4 FREE SERPL-MCNC: 1.34 NG/DL (ref 0.82–1.77)
TRIGL SERPL-MCNC: 192 MG/DL (ref 0–149)
TSH SERPL DL<=0.005 MIU/L-ACNC: 3.13 UIU/ML (ref 0.45–4.5)
VIT B12 SERPL-MCNC: 732 PG/ML (ref 232–1245)
WBC # BLD AUTO: 5.1 X10E3/UL (ref 3.4–10.8)

## 2024-10-16 ENCOUNTER — TELEPHONE (OUTPATIENT)
Age: 54
End: 2024-10-16

## 2024-10-16 NOTE — TELEPHONE ENCOUNTER
Pt called office in regard to past REFERRALS for [facility] that were given to him, as pt does NOT use a [facility] chiropractor. Pt stated his ins co is billing him for past visits that he knows he had referrals for. Pt received bills for visits. Pt sees [facility] in [place] and will need copies of the referrals please. Docs scanned in media dated 5/3/24. I tried to call pt back but he did not answer.    Pt is not referring to referral from 7/16/24 as he was not using an in network provider.     Pt can be reached at Phone#:   (Home) [phone]   Thank you.

## 2025-01-17 ENCOUNTER — OFFICE VISIT (OUTPATIENT)
Dept: FAMILY MEDICINE CLINIC | Facility: CLINIC | Age: 55
End: 2025-01-17
Payer: COMMERCIAL

## 2025-01-17 VITALS — BODY MASS INDEX: 34.73 KG/M2 | WEIGHT: 249 LBS

## 2025-01-17 DIAGNOSIS — B00.89 HERPETIC WHITLOW: Primary | ICD-10-CM

## 2025-01-17 PROCEDURE — 99214 OFFICE O/P EST MOD 30 MIN: CPT | Performed by: FAMILY MEDICINE

## 2025-01-17 RX ORDER — VALACYCLOVIR HYDROCHLORIDE 1 G/1
1000 TABLET, FILM COATED ORAL 3 TIMES DAILY
Qty: 21 TABLET | Refills: 0 | Status: SHIPPED | OUTPATIENT
Start: 2025-01-17 | End: 2025-01-24

## 2025-01-17 NOTE — ASSESSMENT & PLAN NOTE
Pt notes pain to left index finger x 3 days. He denies any injury to the finger/fingernail. He has had similar pain in the past secondary to trauma but says this has progressed worse than past episodes. Of note, pt has a sore on the left side of his lip that he first noticed 5 days ago that he subsequently squeezed and popped 2 days ago. He reports clear fluid coming out of the vesicle.  Pt also reports nasal congestion with blood and clear mucus. He also used Tylenol cold and flu, Dayquil/Nyquil with some relief, but pt says this was drying him out further so he stopped using. He tried using saline nasal spray since yesterday which has helped but still has trouble getting more than 3-4 hours sleep.. Pt also reports headache intermittently for the past few days, which is relieved by Tylenol. He denies fever, chills, SOB, CP, n/v, diarrhea, abdominal pain.

## 2025-01-17 NOTE — PROGRESS NOTES
Assessment/Plan:    Herpetic alena  Pt notes pain to left index finger x 3 days. He denies any injury to the finger/fingernail. He has had similar pain in the past secondary to trauma but says this has progressed worse than past episodes. Of note, pt has a sore on the left side of his lip that he first noticed 5 days ago that he subsequently squeezed and popped 2 days ago. He reports clear fluid coming out of the vesicle.  Pt also reports nasal congestion with blood and clear mucus. He also used Tylenol cold and flu, Dayquil/Nyquil with some relief, but pt says this was drying him out further so he stopped using. He tried using saline nasal spray since yesterday which has helped but still has trouble getting more than 3-4 hours sleep.. Pt also reports headache intermittently for the past few days, which is relieved by Tylenol. He denies fever, chills, SOB, CP, n/v, diarrhea, abdominal pain.       Diagnoses and all orders for this visit:    Herpetic alena  -     valACYclovir (VALTREX) 1,000 mg tablet; Take 1 tablet (1,000 mg total) by mouth 3 (three) times a day for 7 days          Subjective:   Chief Complaint   Patient presents with    Cold Like Symptoms        Patient ID: Ghassan Kahn is a 54 y.o. male.    HPI    The following portions of the patient's history were reviewed and updated as appropriate: allergies, current medications, past family history, past medical history, past social history, past surgical history and problem list.    Review of Systems   Constitutional:  Negative for chills and fever.   HENT:  Positive for congestion and nosebleeds (dried blood bilateral nares, superficial laceration to base of right nare). Negative for ear pain and sore throat.    Respiratory:  Negative for cough and shortness of breath.    Cardiovascular:  Negative for chest pain.   Gastrointestinal:  Negative for abdominal pain, diarrhea, nausea and vomiting.   Genitourinary:  Negative for dysuria.   Musculoskeletal:   Negative for arthralgias and myalgias.         Objective:  Vitals:    01/17/25 0959   Weight: 113 kg (249 lb)      Physical Exam

## 2025-01-23 ENCOUNTER — OFFICE VISIT (OUTPATIENT)
Dept: FAMILY MEDICINE CLINIC | Facility: CLINIC | Age: 55
End: 2025-01-23
Payer: COMMERCIAL

## 2025-01-23 VITALS
HEART RATE: 102 BPM | WEIGHT: 244.2 LBS | OXYGEN SATURATION: 98 % | TEMPERATURE: 98.7 F | BODY MASS INDEX: 34.19 KG/M2 | HEIGHT: 71 IN

## 2025-01-23 DIAGNOSIS — L01.00 IMPETIGO: Primary | ICD-10-CM
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
## 2025-01-23 PROCEDURE — 99214 OFFICE O/P EST MOD 30 MIN: CPT | Performed by: FAMILY MEDICINE

## 2025-01-23 RX ORDER — MUPIROCIN 20 MG/G
OINTMENT TOPICAL
Qty: 30 G | Refills: 1 | Status: SHIPPED | OUTPATIENT
Start: 2025-01-23

## 2025-01-23 NOTE — PROGRESS NOTES
"Assessment/Plan:    Impetigo  Secondary to Herpes     Diagnoses and all orders for this visit:    Impetigo    Dog bite, initial encounter          Subjective:   Chief Complaint   Patient presents with    Follow-up     From 1/17 some symptoms have improved some haven't. Finger is numb. Has been getting body aches in the afternoon and pounding headache been using tylenol        Patient ID: Ghassan Kahn is a 54 y.o. male.    HPI    The following portions of the patient's history were reviewed and updated as appropriate: allergies, current medications, past family history, past medical history, past social history, past surgical history and problem list.    Review of Systems   Constitutional:  Negative for fatigue, fever and unexpected weight change.   HENT:  Negative for congestion, sinus pain and sore throat.    Eyes:  Negative for visual disturbance.   Respiratory:  Negative for shortness of breath and wheezing.    Cardiovascular:  Negative for chest pain and palpitations.   Gastrointestinal:  Negative for abdominal pain, nausea and vomiting.   Musculoskeletal: Negative.  Negative for arthralgias and myalgias.   Neurological:  Negative for syncope, weakness and numbness.   Psychiatric/Behavioral: Negative.  Negative for confusion, dysphoric mood and suicidal ideas.          Objective:  Vitals:    01/23/25 1509   Pulse: 102   Temp: 98.7 °F (37.1 °C)   TempSrc: Tympanic   SpO2: 98%   Weight: 111 kg (244 lb 3.2 oz)   Height: 5' 11\" (1.803 m)      Physical Exam  Constitutional:       Appearance: He is well-developed.   HENT:      Right Ear: Ear canal normal. Tympanic membrane is not injected.      Left Ear: Ear canal normal. Tympanic membrane is not injected.      Nose: Nose normal.   Eyes:      General:         Right eye: No discharge.         Left eye: No discharge.      Conjunctiva/sclera: Conjunctivae normal.      Pupils: Pupils are equal, round, and reactive to light.   Neck:      Thyroid: No thyromegaly. "   Cardiovascular:      Rate and Rhythm: Normal rate and regular rhythm.      Heart sounds: Normal heart sounds. No murmur heard.  Pulmonary:      Effort: Pulmonary effort is normal. No respiratory distress.      Breath sounds: Normal breath sounds. No wheezing.   Abdominal:      General: Bowel sounds are normal. There is no distension.      Palpations: Abdomen is soft.      Tenderness: There is no abdominal tenderness.   Musculoskeletal:         General: Normal range of motion.      Cervical back: Normal range of motion and neck supple.   Lymphadenopathy:      Cervical: No cervical adenopathy.   Skin:     General: Skin is warm and dry.   Neurological:      Mental Status: He is alert and oriented to person, place, and time. He is not disoriented.      Sensory: No sensory deficit.      Motor: No weakness.      Coordination: Coordination normal.      Gait: Gait normal.      Deep Tendon Reflexes: Reflexes are normal and symmetric.   Psychiatric:         Speech: Speech normal.         Behavior: Behavior normal.         Thought Content: Thought content normal.         Judgment: Judgment normal.

## 2025-02-07 ENCOUNTER — OFFICE VISIT (OUTPATIENT)
Dept: FAMILY MEDICINE CLINIC | Facility: CLINIC | Age: 55
End: 2025-02-07
Payer: COMMERCIAL

## 2025-02-07 VITALS
HEART RATE: 83 BPM | BODY MASS INDEX: 33.53 KG/M2 | TEMPERATURE: 98 F | SYSTOLIC BLOOD PRESSURE: 138 MMHG | OXYGEN SATURATION: 95 % | WEIGHT: 240.4 LBS | DIASTOLIC BLOOD PRESSURE: 80 MMHG

## 2025-02-07 DIAGNOSIS — R05.1 ACUTE COUGH: Primary | ICD-10-CM

## 2025-02-07 DIAGNOSIS — J40 BRONCHITIS: ICD-10-CM

## 2025-02-07 LAB
SARS-COV-2 AG UPPER RESP QL IA: NEGATIVE
SL AMB POCT RAPID FLU A: NORMAL
SL AMB POCT RAPID FLU B: NORMAL
VALID CONTROL: NORMAL

## 2025-02-07 PROCEDURE — 99213 OFFICE O/P EST LOW 20 MIN: CPT

## 2025-02-07 PROCEDURE — 87811 SARS-COV-2 COVID19 W/OPTIC: CPT

## 2025-02-07 PROCEDURE — 87804 INFLUENZA ASSAY W/OPTIC: CPT

## 2025-02-07 RX ORDER — PREDNISONE 20 MG/1
TABLET ORAL
Qty: 15 TABLET | Refills: 0 | Status: SHIPPED | OUTPATIENT
Start: 2025-02-07

## 2025-02-07 RX ORDER — BENZONATATE 200 MG/1
200 CAPSULE ORAL 3 TIMES DAILY PRN
Qty: 20 CAPSULE | Refills: 0 | Status: SHIPPED | OUTPATIENT
Start: 2025-02-07

## 2025-02-07 RX ORDER — AZITHROMYCIN 250 MG/1
TABLET, FILM COATED ORAL
Qty: 6 TABLET | Refills: 0 | Status: SHIPPED | OUTPATIENT
Start: 2025-02-07 | End: 2025-02-11

## 2025-02-07 NOTE — PROGRESS NOTES
Name: Ghassan Kahn      : 1970      MRN: [id number]  Encounter Provider: RUFINO Billingsley  Encounter Date: 2025   Encounter department: [facility]  :  Assessment & Plan  Acute cough    Orders:    POCT rapid flu A and B    POCT Rapid Covid Ag    Bronchitis    Orders:    azithromycin (ZITHROMAX) 250 mg tablet; 2 tabs Day 1, then 1 tab daily X 4 days    predniSONE 20 mg tablet; TAKE 1 TABLET BID X 5 DAYS THEN TAKE 1 TABLET QD FOR 5 DAYS    benzonatate (TESSALON) 200 MG capsule; Take 1 capsule (200 mg total) by mouth 3 (three) times a day as needed for cough          Depression Screening and Follow-up Plan: Patient was screened for depression during today's encounter. They screened negative with a PHQ-2 score of 0.      History of Present Illness   Cough  This is a new problem. The current episode started yesterday. The problem has been unchanged. The cough is Productive of sputum. Associated symptoms include chills, headaches, myalgias, nasal congestion and sweats. Pertinent negatives include no chest pain, ear congestion, ear pain, fever, heartburn, hemoptysis, postnasal drip, rash, rhinorrhea, sore throat, shortness of breath, weight loss or wheezing. Treatments tried: mucinex, day/nyquil.     Review of Systems   Constitutional:  Positive for chills and fatigue. Negative for activity change, fever and weight loss.   HENT:  Negative for congestion, ear pain, postnasal drip, rhinorrhea, sinus pressure and sore throat.    Eyes:  Negative for pain.   Respiratory:  Positive for cough. Negative for hemoptysis, shortness of breath and wheezing.    Cardiovascular:  Negative for chest pain and leg swelling.   Gastrointestinal:  Negative for abdominal pain, diarrhea, heartburn, nausea and vomiting.   Musculoskeletal:  Positive for myalgias. Negative for arthralgias.   Skin:  Negative for rash.   Neurological:  Positive for headaches. Negative for dizziness, weakness and  numbness.   All other systems reviewed and are negative.      Objective   /80 (BP Location: Left arm, Patient Position: Sitting, Cuff Size: Standard)   Pulse 83   Temp 98 °F (36.7 °C) (Tympanic)   Wt 109 kg (240 lb 6.4 oz)   SpO2 95%   BMI 33.53 kg/m²      Physical Exam  Vitals and nursing note reviewed.   Constitutional:       General: He is not in acute distress.     Appearance: Normal appearance. He is well-developed. He is not ill-appearing.   HENT:      Head: Normocephalic and atraumatic.      Right Ear: Tympanic membrane, ear canal and external ear normal. There is no impacted cerumen.      Left Ear: Tympanic membrane, ear canal and external ear normal. There is no impacted cerumen.      Nose: Nose normal. No congestion.      Mouth/Throat:      Mouth: Mucous membranes are moist.      Pharynx: Posterior oropharyngeal erythema present.   Cardiovascular:      Rate and Rhythm: Normal rate and regular rhythm.      Heart sounds: Normal heart sounds. No murmur heard.  Pulmonary:      Effort: Pulmonary effort is normal. No respiratory distress.      Breath sounds: Normal breath sounds. No wheezing.   Abdominal:      General: Bowel sounds are normal. There is no distension.      Palpations: Abdomen is soft.      Tenderness: There is no abdominal tenderness.   Musculoskeletal:      Cervical back: Neck supple.   Skin:     General: Skin is warm and dry.      Capillary Refill: Capillary refill takes less than 2 seconds.   Neurological:      Mental Status: He is alert and oriented to person, place, and time. Mental status is at baseline.   Psychiatric:         Mood and Affect: Mood normal.         Behavior: Behavior normal.       Administrative Statements   I have spent a total time of 30 minutes in caring for this patient on the day of the visit/encounter including Risks and benefits of tx options, Instructions for management, Patient and family education, Importance of tx compliance, Risk factor reductions,  Impressions, Documenting in the medical record, Reviewing / ordering tests, medicine, procedures  , and Obtaining or reviewing history  .

## 2025-02-07 NOTE — PATIENT INSTRUCTIONS
Discussed viral vs bacterial etiology.  Take entire course of azithromycin if you start.  Prednisone for inflammation.  Tessalon perles as needed for cough.  Return if symptoms fail to improve or worsen.

## 2025-02-14 ENCOUNTER — OFFICE VISIT (OUTPATIENT)
Dept: FAMILY MEDICINE CLINIC | Facility: CLINIC | Age: 55
End: 2025-02-14
Payer: COMMERCIAL

## 2025-02-14 ENCOUNTER — HOSPITAL ENCOUNTER (OUTPATIENT)
Dept: RADIOLOGY | Facility: HOSPITAL | Age: 55
End: 2025-02-14
Payer: COMMERCIAL

## 2025-02-14 ENCOUNTER — RESULTS FOLLOW-UP (OUTPATIENT)
Dept: FAMILY MEDICINE CLINIC | Facility: CLINIC | Age: 55
End: 2025-02-14

## 2025-02-14 VITALS
HEART RATE: 83 BPM | OXYGEN SATURATION: 97 % | BODY MASS INDEX: 33.89 KG/M2 | DIASTOLIC BLOOD PRESSURE: 88 MMHG | WEIGHT: 243 LBS | SYSTOLIC BLOOD PRESSURE: 130 MMHG

## 2025-02-14 DIAGNOSIS — J40 BRONCHITIS, NOT SPECIFIED AS ACUTE OR CHRONIC: ICD-10-CM

## 2025-02-14 DIAGNOSIS — J40 BRONCHITIS, NOT SPECIFIED AS ACUTE OR CHRONIC: Primary | ICD-10-CM

## 2025-02-14 PROCEDURE — 99214 OFFICE O/P EST MOD 30 MIN: CPT | Performed by: FAMILY MEDICINE

## 2025-02-14 PROCEDURE — 71046 X-RAY EXAM CHEST 2 VIEWS: CPT

## 2025-02-14 NOTE — PROGRESS NOTES
Assessment/Plan:    No problem-specific Assessment & Plan notes found for this encounter.       Diagnoses and all orders for this visit:    Bronchitis, not specified as acute or chronic  -     XR chest pa and lateral; Future      Sl better on zpk---ck CXR    Subjective:   Chief Complaint   Patient presents with    Follow-up     Bronchitis         Patient ID: Ghassan Kahn is a 54 y.o. male.    HPI    The following portions of the patient's history were reviewed and updated as appropriate: allergies, current medications, past family history, past medical history, past social history, past surgical history and problem list.    Review of Systems   Constitutional:  Negative for appetite change and fatigue.   HENT:  Negative for ear pain, postnasal drip, sinus pressure and sore throat.    Eyes:  Negative for redness and visual disturbance.   Respiratory:  Negative for cough, chest tightness, shortness of breath and wheezing.    Cardiovascular:  Negative for chest pain, palpitations and leg swelling.   Gastrointestinal:  Negative for abdominal pain, blood in stool, constipation, diarrhea, nausea and vomiting.   Genitourinary:  Negative for difficulty urinating, flank pain, hematuria and urgency.   Musculoskeletal:  Negative for arthralgias, back pain, joint swelling and myalgias.   Skin:  Negative for rash and wound.        No suspicious skin lesions   Neurological:  Negative for light-headedness, numbness and headaches.   Psychiatric/Behavioral:  Negative for confusion, decreased concentration, sleep disturbance and suicidal ideas. The patient is not nervous/anxious.          Objective:  Vitals:    02/14/25 0920   BP: 130/88   Pulse: 83   SpO2: 97%   Weight: 110 kg (243 lb)      Physical Exam  Constitutional:       General: He is not in acute distress.     Appearance: He is well-developed. He is not diaphoretic.   HENT:      Right Ear: Ear canal normal. Tympanic membrane is not injected.      Left Ear: Ear canal normal.  Tympanic membrane is not injected.      Nose: Nose normal.      Mouth/Throat:      Pharynx: No oropharyngeal exudate.   Eyes:      Conjunctiva/sclera: Conjunctivae normal.      Pupils: Pupils are equal, round, and reactive to light.   Neck:      Thyroid: No thyromegaly.   Cardiovascular:      Rate and Rhythm: Normal rate and regular rhythm.      Heart sounds: Normal heart sounds. No murmur heard.  Pulmonary:      Effort: Pulmonary effort is normal. No respiratory distress.      Breath sounds: Normal breath sounds. No wheezing.   Abdominal:      General: Bowel sounds are normal.      Palpations: Abdomen is soft. There is no hepatomegaly, splenomegaly or mass.      Tenderness: There is no abdominal tenderness.   Musculoskeletal:         General: No tenderness or deformity. Normal range of motion.      Cervical back: Normal range of motion and neck supple.   Skin:     General: Skin is warm and dry.      Coloration: Skin is not pale.      Findings: No rash.   Neurological:      Mental Status: He is alert and oriented to person, place, and time. He is not disoriented.      Sensory: No sensory deficit.      Gait: Gait normal.   Psychiatric:         Speech: Speech normal.         Behavior: Behavior normal.

## 2025-02-22 PROBLEM — L01.00 IMPETIGO: Status: RESOLVED | Noted: 2025-01-23 | Resolved: 2025-02-22

## 2025-05-23 ENCOUNTER — OFFICE VISIT (OUTPATIENT)
Dept: FAMILY MEDICINE CLINIC | Facility: CLINIC | Age: 55
End: 2025-05-23

## 2025-05-23 VITALS
WEIGHT: 244 LBS | DIASTOLIC BLOOD PRESSURE: 86 MMHG | TEMPERATURE: 98.8 F | SYSTOLIC BLOOD PRESSURE: 134 MMHG | OXYGEN SATURATION: 97 % | HEART RATE: 91 BPM | BODY MASS INDEX: 34.03 KG/M2

## 2025-05-23 DIAGNOSIS — F51.01 PRIMARY INSOMNIA: ICD-10-CM

## 2025-05-23 DIAGNOSIS — B35.6 JOCK ITCH: Primary | ICD-10-CM

## 2025-05-23 DIAGNOSIS — N40.1 BENIGN PROSTATIC HYPERPLASIA WITH INCOMPLETE BLADDER EMPTYING: ICD-10-CM

## 2025-05-23 DIAGNOSIS — R39.14 BENIGN PROSTATIC HYPERPLASIA WITH INCOMPLETE BLADDER EMPTYING: ICD-10-CM

## 2025-05-23 DIAGNOSIS — L01.00 IMPETIGO: ICD-10-CM

## 2025-05-23 RX ORDER — CYCLOBENZAPRINE HCL 10 MG
10 TABLET ORAL 3 TIMES DAILY PRN
Qty: 90 TABLET | Refills: 3 | Status: SHIPPED | OUTPATIENT
Start: 2025-05-23

## 2025-05-23 RX ORDER — TERBINAFINE HYDROCHLORIDE 250 MG/1
250 TABLET ORAL DAILY
Qty: 30 TABLET | Refills: 0 | Status: SHIPPED | OUTPATIENT
Start: 2025-05-23 | End: 2025-06-22

## 2025-05-23 RX ORDER — FINASTERIDE 5 MG/1
5 TABLET, FILM COATED ORAL DAILY
Qty: 90 TABLET | Refills: 3 | Status: SHIPPED | OUTPATIENT
Start: 2025-05-23

## 2025-05-23 RX ORDER — CLOTRIMAZOLE AND BETAMETHASONE DIPROPIONATE 10; .64 MG/G; MG/G
1 CREAM TOPICAL 2 TIMES DAILY
Qty: 135 G | Refills: 2 | Status: SHIPPED | OUTPATIENT
Start: 2025-05-23

## 2025-05-23 RX ORDER — MUPIROCIN 20 MG/G
OINTMENT TOPICAL
Qty: 30 G | Refills: 1 | Status: SHIPPED | OUTPATIENT
Start: 2025-05-23

## 2025-05-23 RX ORDER — TAMSULOSIN HYDROCHLORIDE 0.4 MG/1
0.4 CAPSULE ORAL
Qty: 90 CAPSULE | Refills: 3 | Status: SHIPPED | OUTPATIENT
Start: 2025-05-23

## 2025-05-23 NOTE — ASSESSMENT & PLAN NOTE
This is a recurrent problem. The current episode started 1 to 4 weeks ago. The problem has been gradually worsening since onset. The affected locations include the groin and buttocks. The rash is characterized by pain and redness. He was exposed to nothing. Pertinent negatives include no anorexia, congestion, cough, decreased physical activity, decreased responsiveness, decreased sleep, drinking less, diarrhea, facial edema, fatigue, fever, itching, joint pain, rhinorrhea, shortness of breath, sore throat or vomiting. Treatments tried: lotrisone. The treatment provided mild relief.     Patient has had a severe/persistent episode of tinea cruris in the past--requiring systemic antifungal. Lamisil prescribed. Patient encouraged to stop antifungal if rash clears after 2 weeks of treatment, otherwise complete 4 weeks. Ref derm as patient with several skin ailments---hx herpetic alena, impetigo, jock itch.    Component      Latest Ref Rng 9/27/2024   AST      0 - 40 IU/L 20    ALT      0 - 44 IU/L 16          Orders:    terbinafine (LamISIL) 250 mg tablet; Take 1 tablet (250 mg total) by mouth daily    Ambulatory Referral to Dermatology; Future    clotrimazole-betamethasone (LOTRISONE) 1-0.05 % cream; Apply 1 Application topically 2 (two) times a day To affected area

## 2025-05-23 NOTE — ASSESSMENT & PLAN NOTE
Well controlled with current medication regimen. Continue finasteride and tamsulosin.   Orders:    finasteride (PROSCAR) 5 mg tablet; Take 1 tablet (5 mg total) by mouth daily    tamsulosin (FLOMAX) 0.4 mg; Take 1 capsule (0.4 mg total) by mouth daily with dinner

## 2025-05-23 NOTE — PROGRESS NOTES
Name: Ghassan Kahn      : 1970      MRN: [id number]  Encounter Provider: RUFINO Billingsley  Encounter Date: 2025   Encounter department: [facility]  :  Assessment & Plan  Jock itch  This is a recurrent problem. The current episode started 1 to 4 weeks ago. The problem has been gradually worsening since onset. The affected locations include the groin and buttocks. The rash is characterized by pain and redness. He was exposed to nothing. Pertinent negatives include no anorexia, congestion, cough, decreased physical activity, decreased responsiveness, decreased sleep, drinking less, diarrhea, facial edema, fatigue, fever, itching, joint pain, rhinorrhea, shortness of breath, sore throat or vomiting. Treatments tried: lotrisone. The treatment provided mild relief.     Patient has had a severe/persistent episode of tinea cruris in the past--requiring systemic antifungal. Lamisil prescribed. Patient encouraged to stop antifungal if rash clears after 2 weeks of treatment, otherwise complete 4 weeks. Ref derm as patient with several skin ailments---hx herpetic alena, impetigo, jock itch.    Component      Latest Ref Rng 2024   AST      0 - 40 IU/L 20    ALT      0 - 44 IU/L 16          Orders:    terbinafine (LamISIL) 250 mg tablet; Take 1 tablet (250 mg total) by mouth daily    Ambulatory Referral to Dermatology; Future    clotrimazole-betamethasone (LOTRISONE) 1-0.05 % cream; Apply 1 Application topically 2 (two) times a day To affected area    Impetigo  Reports that he gets sores in his nose. Was previously prescribed mupirocin ointment for MRSA and valtrex for herpetic infection. Has a small wound inside right nostril. Ordered mupirocin cream. Encouraged to monitor and follow up if symptoms persist or fail to improve.  Orders:    mupirocin (BACTROBAN) 2 % ointment; Apply intanasal    Benign prostatic hyperplasia with incomplete bladder emptying  Well  controlled with current medication regimen. Continue finasteride and tamsulosin.   Orders:    finasteride (PROSCAR) 5 mg tablet; Take 1 tablet (5 mg total) by mouth daily    tamsulosin (FLOMAX) 0.4 mg; Take 1 capsule (0.4 mg total) by mouth daily with dinner    Primary insomnia  Well controlled with cyclobenzaprine. Continue current medication regimen.   Orders:    cyclobenzaprine (FLEXERIL) 10 mg tablet; Take 1 tablet (10 mg total) by mouth 3 (three) times a day as needed for muscle spasms          Depression Screening and Follow-up Plan: Patient was screened for depression during today's encounter. They screened negative with a PHQ-2 score of 0.      Tobacco Cessation Counseling: Tobacco cessation counseling was provided. The patient is sincerely urged to quit consumption of tobacco. He is not ready to quit tobacco.       History of Present Illness   Rash  This is a recurrent problem. The current episode started 1 to 4 weeks ago. The problem has been gradually worsening since onset. The affected locations include the groin. The rash is characterized by pain and redness. He was exposed to nothing. Pertinent negatives include no anorexia, congestion, cough, decreased physical activity, decreased responsiveness, decreased sleep, drinking less, diarrhea, facial edema, fatigue, fever, itching, joint pain, rhinorrhea, shortness of breath, sore throat or vomiting. Treatments tried: lotrisone. The treatment provided mild relief.     Review of Systems   Constitutional:  Negative for activity change, decreased responsiveness, fatigue and fever.   HENT:  Negative for congestion, ear pain, rhinorrhea and sore throat.    Eyes:  Negative for pain.   Respiratory:  Negative for cough, shortness of breath and wheezing.    Cardiovascular:  Negative for chest pain and leg swelling.   Gastrointestinal:  Negative for abdominal pain, anorexia, diarrhea, nausea and vomiting.   Musculoskeletal:  Negative for arthralgias, joint pain and  myalgias.   Skin:  Positive for rash. Negative for itching.   Neurological:  Negative for dizziness, weakness and numbness.   All other systems reviewed and are negative.      Objective   /86 (BP Location: Left arm, Patient Position: Sitting, Cuff Size: Standard)   Pulse 91   Temp 98.8 °F (37.1 °C) (Tympanic)   Wt 111 kg (244 lb)   SpO2 97%   BMI 34.03 kg/m²      Physical Exam  Vitals and nursing note reviewed.   Constitutional:       General: He is not in acute distress.     Appearance: Normal appearance. He is well-developed. He is not ill-appearing.   HENT:      Head: Normocephalic and atraumatic.      Right Ear: External ear normal.      Left Ear: External ear normal.   Pulmonary:      Effort: Pulmonary effort is normal. No respiratory distress.     Musculoskeletal:      Cervical back: Neck supple.     Skin:     General: Skin is warm and dry.      Findings: Rash (see pciture in media--- rash present BL groin and buttocks) present.     Neurological:      Mental Status: He is alert and oriented to person, place, and time. Mental status is at baseline.     Psychiatric:         Mood and Affect: Mood normal.         Behavior: Behavior normal.       Administrative Statements   I have spent a total time of 30 minutes in caring for this patient on the day of the visit/encounter including Risks and benefits of tx options, Instructions for management, Patient and family education, Importance of tx compliance, Risk factor reductions, Impressions, Documenting in the medical record, Reviewing/placing orders in the medical record (including tests, medications, and/or procedures), and Obtaining or reviewing history  .

## 2025-06-09 ENCOUNTER — OFFICE VISIT (OUTPATIENT)
Dept: FAMILY MEDICINE CLINIC | Facility: CLINIC | Age: 55
End: 2025-06-09
Payer: COMMERCIAL

## 2025-06-09 VITALS
DIASTOLIC BLOOD PRESSURE: 88 MMHG | HEART RATE: 83 BPM | OXYGEN SATURATION: 97 % | BODY MASS INDEX: 34.17 KG/M2 | SYSTOLIC BLOOD PRESSURE: 138 MMHG | WEIGHT: 245 LBS

## 2025-06-09 DIAGNOSIS — J40 BRONCHITIS: Primary | ICD-10-CM

## 2025-06-09 PROCEDURE — 99213 OFFICE O/P EST LOW 20 MIN: CPT | Performed by: NURSE PRACTITIONER

## 2025-06-09 RX ORDER — AZITHROMYCIN 250 MG/1
TABLET, FILM COATED ORAL
Qty: 6 TABLET | Refills: 0 | Status: SHIPPED | OUTPATIENT
Start: 2025-06-09 | End: 2025-06-14

## 2025-06-09 NOTE — PROGRESS NOTES
Name: Ghassan Kahn      : 1970      MRN: [id number]  Encounter Provider: RUFINO Lee  Encounter Date: 2025   Encounter department: [facility]  :  Assessment & Plan  Bronchitis    Orders:    azithromycin (Zithromax) 250 mg tablet; Take 2 tablets (500 mg total) by mouth daily for 1 day, THEN 1 tablet (250 mg total) daily for 4 days.           History of Present Illness   C/o cough, congestion, sore throat for over a week. Tried otc medications with mild relief.    Cough  This is a new problem. The current episode started 1 to 4 weeks ago. The problem has been unchanged. The cough is Productive of sputum. Associated symptoms include ear pain, nasal congestion, postnasal drip, rhinorrhea and a sore throat. Pertinent negatives include no chest pain, chills, eye redness, fever, myalgias, rash or wheezing. Nothing aggravates the symptoms. He has tried OTC cough suppressant for the symptoms. The treatment provided mild relief.     Review of Systems   Constitutional:  Negative for activity change, chills, fatigue and fever.   HENT:  Positive for congestion, ear pain, postnasal drip, rhinorrhea and sore throat. Negative for sinus pressure and sinus pain.    Eyes:  Negative for pain, discharge and redness.   Respiratory:  Positive for cough. Negative for chest tightness and wheezing.    Cardiovascular:  Negative for chest pain.   Gastrointestinal:  Negative for constipation and nausea.   Musculoskeletal:  Negative for myalgias.   Skin:  Negative for rash.   Neurological:  Negative for dizziness.       Objective   /88 (BP Location: Left arm, Patient Position: Sitting, Cuff Size: Standard)   Pulse 83   Wt 111 kg (245 lb)   SpO2 97%   BMI 34.17 kg/m²      Physical Exam  Vitals and nursing note reviewed.   Constitutional:       General: He is not in acute distress.     Appearance: Normal appearance. He is ill-appearing. He is not diaphoretic.   HENT:      Head:  Normocephalic.      Right Ear: Tympanic membrane, ear canal and external ear normal. There is no impacted cerumen.      Left Ear: Tympanic membrane, ear canal and external ear normal. There is no impacted cerumen.      Nose: Congestion and rhinorrhea present.      Mouth/Throat:      Mouth: Mucous membranes are moist.      Pharynx: Posterior oropharyngeal erythema present. No oropharyngeal exudate.     Eyes:      General:         Right eye: No discharge.      Extraocular Movements: Extraocular movements intact.       Cardiovascular:      Rate and Rhythm: Normal rate and regular rhythm.      Heart sounds: Normal heart sounds.   Pulmonary:      Effort: No respiratory distress.      Breath sounds: Rhonchi present.   Chest:      Chest wall: No tenderness.     Musculoskeletal:         General: Normal range of motion.      Cervical back: Normal range of motion and neck supple. No rigidity or tenderness.   Lymphadenopathy:      Cervical: No cervical adenopathy.     Skin:     General: Skin is warm and dry.      Coloration: Skin is not pale.      Findings: No erythema.     Neurological:      Mental Status: He is alert and oriented to person, place, and time.     Psychiatric:         Mood and Affect: Mood normal.         Behavior: Behavior normal.

## 2025-06-16 ENCOUNTER — TELEPHONE (OUTPATIENT)
Age: 55
End: 2025-06-16

## 2025-06-16 NOTE — TELEPHONE ENCOUNTER
Patient is requesting an insurance referral for the following specialty:      Test Name / Order Name:     DX Code: b35.6    Date Of Service: 07/03/2025    Location/Facility Name/Address/Phone #: [facility]    Location / Facility NPI: [id number]    Best Phone # To Reach The Patient:  [phone]

## 2025-07-28 ENCOUNTER — TELEPHONE (OUTPATIENT)
Dept: FAMILY MEDICINE CLINIC | Facility: CLINIC | Age: 55
End: 2025-07-28

## (undated) DEVICE — PLUMEPEN PRO 10FT

## (undated) DEVICE — SUT VICRYL 0 REEL 54 IN J287G

## (undated) DEVICE — TOWEL SET X-RAY

## (undated) DEVICE — STRL PENROSE DRAIN 18" X 1/4": Brand: CARDINAL HEALTH

## (undated) DEVICE — STRL UNIVERSAL MINOR GENERAL: Brand: CARDINAL HEALTH

## (undated) DEVICE — ADHESIVE SKN CLSR HISTOACRYL FLEX 0.5ML LF

## (undated) DEVICE — CONMED ACCESSORY ELECTRODE, FLAT BLADE WITH EXTENDED INSULATION: Brand: CONMED

## (undated) DEVICE — GLOVE INDICATOR PI UNDERGLOVE SZ 6.5 BLUE

## (undated) DEVICE — GLOVE SRG BIOGEL 6.5

## (undated) DEVICE — GLOVE SRG BIOGEL ECLIPSE 8

## (undated) DEVICE — SCD SEQUENTIAL COMPRESSION COMFORT SLEEVE MEDIUM KNEE LENGTH: Brand: KENDALL SCD

## (undated) DEVICE — VIAL DECANTER

## (undated) DEVICE — REM POLYHESIVE ADULT PATIENT RETURN ELECTRODE: Brand: VALLEYLAB

## (undated) DEVICE — CHLORAPREP HI-LITE 26ML ORANGE

## (undated) DEVICE — GLOVE INDICATOR PI UNDERGLOVE SZ 8 BLUE

## (undated) DEVICE — PEANUT 5 PK

## (undated) DEVICE — SUT VICRYL 3-0 SH 27 IN J416H

## (undated) DEVICE — NEEDLE 25G X 1 1/2

## (undated) DEVICE — SUT PDS II 2-0 SH 27 IN Z317H

## (undated) DEVICE — SUT PROLENE 2-0 CT-2 30 IN 8411H

## (undated) DEVICE — TUBING SUCTION 5MM X 12 FT

## (undated) DEVICE — MEDI-VAC YANKAUER SUCTION HANDLE W/BULBOUS TIP: Brand: CARDINAL HEALTH

## (undated) DEVICE — INTENDED FOR TISSUE SEPARATION, AND OTHER PROCEDURES THAT REQUIRE A SHARP SURGICAL BLADE TO PUNCTURE OR CUT.: Brand: BARD-PARKER SAFETY BLADES SIZE 15, STERILE

## (undated) DEVICE — SUT MONOCRYL 4-0 PS-2 27 IN Y426H